# Patient Record
Sex: MALE | Race: WHITE | Employment: OTHER | URBAN - METROPOLITAN AREA
[De-identification: names, ages, dates, MRNs, and addresses within clinical notes are randomized per-mention and may not be internally consistent; named-entity substitution may affect disease eponyms.]

---

## 2024-03-08 ENCOUNTER — HOSPITAL ENCOUNTER (INPATIENT)
Facility: HOSPITAL | Age: 64
LOS: 6 days | Discharge: HOME | End: 2024-03-14
Attending: EMERGENCY MEDICINE | Admitting: HOSPITALIST
Payer: MEDICARE

## 2024-03-08 ENCOUNTER — APPOINTMENT (OUTPATIENT)
Dept: INTERVENTIONAL RADIOLOGY/VASCULAR | Facility: HOSPITAL | Age: 64
End: 2024-03-08
Attending: INTERNAL MEDICINE
Payer: MEDICARE

## 2024-03-08 ENCOUNTER — APPOINTMENT (OUTPATIENT)
Dept: GENERAL RADIOLOGY | Facility: HOSPITAL | Age: 64
End: 2024-03-08
Attending: EMERGENCY MEDICINE
Payer: MEDICARE

## 2024-03-08 DIAGNOSIS — I47.20 VENTRICULAR TACHYCARDIA (HCC): Primary | ICD-10-CM

## 2024-03-08 LAB
ALBUMIN SERPL-MCNC: 3.9 G/DL (ref 3.4–5)
ALBUMIN/GLOB SERPL: 1.1 {RATIO} (ref 1–2)
ALP LIVER SERPL-CCNC: 60 U/L
ALT SERPL-CCNC: 45 U/L
ANION GAP SERPL CALC-SCNC: 11 MMOL/L (ref 0–18)
APTT PPP: 28.6 SECONDS (ref 23.3–35.6)
AST SERPL-CCNC: 31 U/L (ref 15–37)
BASOPHILS # BLD AUTO: 0.07 X10(3) UL (ref 0–0.2)
BASOPHILS NFR BLD AUTO: 0.7 %
BILIRUB SERPL-MCNC: 1.6 MG/DL (ref 0.1–2)
BUN BLD-MCNC: 13 MG/DL (ref 9–23)
CALCIUM BLD-MCNC: 9.5 MG/DL (ref 8.5–10.1)
CHLORIDE SERPL-SCNC: 108 MMOL/L (ref 98–112)
CO2 SERPL-SCNC: 20 MMOL/L (ref 21–32)
CREAT BLD-MCNC: 1.07 MG/DL
EGFRCR SERPLBLD CKD-EPI 2021: 77 ML/MIN/1.73M2 (ref 60–?)
EOSINOPHIL # BLD AUTO: 0.32 X10(3) UL (ref 0–0.7)
EOSINOPHIL NFR BLD AUTO: 3.1 %
ERYTHROCYTE [DISTWIDTH] IN BLOOD BY AUTOMATED COUNT: 19.3 %
GLOBULIN PLAS-MCNC: 3.6 G/DL (ref 2.8–4.4)
GLUCOSE BLD-MCNC: 125 MG/DL (ref 70–99)
GLUCOSE BLD-MCNC: 126 MG/DL (ref 70–99)
HCT VFR BLD AUTO: 46.5 %
HGB BLD-MCNC: 14.6 G/DL
IMM GRANULOCYTES # BLD AUTO: 0.04 X10(3) UL (ref 0–1)
IMM GRANULOCYTES NFR BLD: 0.4 %
INR BLD: 1.07 (ref 0.8–1.2)
LYMPHOCYTES # BLD AUTO: 4.05 X10(3) UL (ref 1–4)
LYMPHOCYTES NFR BLD AUTO: 39.3 %
MAGNESIUM SERPL-MCNC: 1.9 MG/DL (ref 1.6–2.6)
MCH RBC QN AUTO: 19 PG (ref 26–34)
MCHC RBC AUTO-ENTMCNC: 31.4 G/DL (ref 31–37)
MCV RBC AUTO: 60.6 FL
MONOCYTES # BLD AUTO: 0.96 X10(3) UL (ref 0.1–1)
MONOCYTES NFR BLD AUTO: 9.3 %
NEUTROPHILS # BLD AUTO: 4.87 X10 (3) UL (ref 1.5–7.7)
NEUTROPHILS # BLD AUTO: 4.87 X10(3) UL (ref 1.5–7.7)
NEUTROPHILS NFR BLD AUTO: 47.2 %
OSMOLALITY SERPL CALC.SUM OF ELEC: 290 MOSM/KG (ref 275–295)
PLATELET # BLD AUTO: 45 10(3)UL (ref 150–450)
POTASSIUM SERPL-SCNC: 3.7 MMOL/L (ref 3.5–5.1)
PROT SERPL-MCNC: 7.5 G/DL (ref 6.4–8.2)
PROTHROMBIN TIME: 13.9 SECONDS (ref 11.6–14.8)
RBC # BLD AUTO: 7.67 X10(6)UL
SARS-COV-2 RNA RESP QL NAA+PROBE: NOT DETECTED
SODIUM SERPL-SCNC: 139 MMOL/L (ref 136–145)
TROPONIN I SERPL HS-MCNC: 12 NG/L
WBC # BLD AUTO: 10.3 X10(3) UL (ref 4–11)

## 2024-03-08 PROCEDURE — 71045 X-RAY EXAM CHEST 1 VIEW: CPT | Performed by: EMERGENCY MEDICINE

## 2024-03-08 PROCEDURE — B215YZZ FLUOROSCOPY OF LEFT HEART USING OTHER CONTRAST: ICD-10-PCS | Performed by: INTERNAL MEDICINE

## 2024-03-08 PROCEDURE — B211YZZ FLUOROSCOPY OF MULTIPLE CORONARY ARTERIES USING OTHER CONTRAST: ICD-10-PCS | Performed by: INTERNAL MEDICINE

## 2024-03-08 PROCEDURE — 5A2204Z RESTORATION OF CARDIAC RHYTHM, SINGLE: ICD-10-PCS | Performed by: EMERGENCY MEDICINE

## 2024-03-08 PROCEDURE — 99223 1ST HOSP IP/OBS HIGH 75: CPT | Performed by: STUDENT IN AN ORGANIZED HEALTH CARE EDUCATION/TRAINING PROGRAM

## 2024-03-08 PROCEDURE — 4A023N7 MEASUREMENT OF CARDIAC SAMPLING AND PRESSURE, LEFT HEART, PERCUTANEOUS APPROACH: ICD-10-PCS | Performed by: INTERNAL MEDICINE

## 2024-03-08 RX ORDER — ATORVASTATIN CALCIUM 40 MG/1
40 TABLET, FILM COATED ORAL NIGHTLY
COMMUNITY

## 2024-03-08 RX ORDER — BENZONATATE 100 MG/1
200 CAPSULE ORAL 3 TIMES DAILY PRN
Status: DISCONTINUED | OUTPATIENT
Start: 2024-03-08 | End: 2024-03-14

## 2024-03-08 RX ORDER — POLYETHYLENE GLYCOL 3350 17 G/17G
17 POWDER, FOR SOLUTION ORAL DAILY PRN
Status: DISCONTINUED | OUTPATIENT
Start: 2024-03-08 | End: 2024-03-14

## 2024-03-08 RX ORDER — LIDOCAINE HYDROCHLORIDE 10 MG/ML
INJECTION, SOLUTION EPIDURAL; INFILTRATION; INTRACAUDAL; PERINEURAL
Status: COMPLETED
Start: 2024-03-08 | End: 2024-03-08

## 2024-03-08 RX ORDER — IODIXANOL 320 MG/ML
150 INJECTION, SOLUTION INTRAVASCULAR
Status: COMPLETED | OUTPATIENT
Start: 2024-03-08 | End: 2024-03-08

## 2024-03-08 RX ORDER — MELATONIN
3 NIGHTLY PRN
Status: DISCONTINUED | OUTPATIENT
Start: 2024-03-08 | End: 2024-03-14

## 2024-03-08 RX ORDER — IODIXANOL 320 MG/ML
100 INJECTION, SOLUTION INTRAVASCULAR
Status: CANCELLED | OUTPATIENT
Start: 2024-03-08

## 2024-03-08 RX ORDER — MIDAZOLAM HYDROCHLORIDE 1 MG/ML
INJECTION INTRAMUSCULAR; INTRAVENOUS
Status: COMPLETED
Start: 2024-03-08 | End: 2024-03-08

## 2024-03-08 RX ORDER — ENEMA 19; 7 G/133ML; G/133ML
1 ENEMA RECTAL ONCE AS NEEDED
Status: DISCONTINUED | OUTPATIENT
Start: 2024-03-08 | End: 2024-03-14

## 2024-03-08 RX ORDER — NICOTINE POLACRILEX 4 MG
15 LOZENGE BUCCAL
Status: DISCONTINUED | OUTPATIENT
Start: 2024-03-08 | End: 2024-03-14

## 2024-03-08 RX ORDER — DAPAGLIFLOZIN 10 MG/1
10 TABLET, FILM COATED ORAL
COMMUNITY

## 2024-03-08 RX ORDER — MAGNESIUM SULFATE HEPTAHYDRATE 40 MG/ML
2 INJECTION, SOLUTION INTRAVENOUS ONCE
Status: COMPLETED | OUTPATIENT
Start: 2024-03-08 | End: 2024-03-09

## 2024-03-08 RX ORDER — DEXTROSE MONOHYDRATE 25 G/50ML
50 INJECTION, SOLUTION INTRAVENOUS
Status: DISCONTINUED | OUTPATIENT
Start: 2024-03-08 | End: 2024-03-14

## 2024-03-08 RX ORDER — SENNOSIDES 8.6 MG
17.2 TABLET ORAL NIGHTLY PRN
Status: DISCONTINUED | OUTPATIENT
Start: 2024-03-08 | End: 2024-03-14

## 2024-03-08 RX ORDER — GUAIFENESIN 600 MG/1
600 TABLET, EXTENDED RELEASE ORAL 2 TIMES DAILY PRN
Status: DISCONTINUED | OUTPATIENT
Start: 2024-03-08 | End: 2024-03-14

## 2024-03-08 RX ORDER — HEPARIN SODIUM 5000 [USP'U]/ML
INJECTION, SOLUTION INTRAVENOUS; SUBCUTANEOUS
Status: COMPLETED
Start: 2024-03-08 | End: 2024-03-08

## 2024-03-08 RX ORDER — METOPROLOL TARTRATE 1 MG/ML
5 INJECTION, SOLUTION INTRAVENOUS ONCE
Status: COMPLETED | OUTPATIENT
Start: 2024-03-08 | End: 2024-03-08

## 2024-03-08 RX ORDER — METOCLOPRAMIDE HYDROCHLORIDE 5 MG/ML
10 INJECTION INTRAMUSCULAR; INTRAVENOUS EVERY 8 HOURS PRN
Status: DISCONTINUED | OUTPATIENT
Start: 2024-03-08 | End: 2024-03-14

## 2024-03-08 RX ORDER — ATORVASTATIN CALCIUM 40 MG/1
40 TABLET, FILM COATED ORAL NIGHTLY
Status: DISCONTINUED | OUTPATIENT
Start: 2024-03-08 | End: 2024-03-08

## 2024-03-08 RX ORDER — LISINOPRIL 20 MG/1
20 TABLET ORAL DAILY
Status: DISCONTINUED | OUTPATIENT
Start: 2024-03-08 | End: 2024-03-08

## 2024-03-08 RX ORDER — ECHINACEA PURPUREA EXTRACT 125 MG
1 TABLET ORAL
Status: DISCONTINUED | OUTPATIENT
Start: 2024-03-08 | End: 2024-03-14

## 2024-03-08 RX ORDER — BISACODYL 10 MG
10 SUPPOSITORY, RECTAL RECTAL
Status: DISCONTINUED | OUTPATIENT
Start: 2024-03-08 | End: 2024-03-14

## 2024-03-08 RX ORDER — ONDANSETRON 2 MG/ML
4 INJECTION INTRAMUSCULAR; INTRAVENOUS EVERY 6 HOURS PRN
Status: DISCONTINUED | OUTPATIENT
Start: 2024-03-08 | End: 2024-03-09

## 2024-03-08 RX ORDER — GLIPIZIDE 5 MG/1
10 TABLET ORAL NIGHTLY
COMMUNITY

## 2024-03-08 RX ORDER — LISINOPRIL 20 MG/1
20 TABLET ORAL DAILY
COMMUNITY

## 2024-03-08 RX ORDER — AMIODARONE HYDROCHLORIDE 50 MG/ML
INJECTION, SOLUTION INTRAVENOUS
Status: DISPENSED
Start: 2024-03-08 | End: 2024-03-09

## 2024-03-08 RX ORDER — HEPARIN SODIUM AND DEXTROSE 10000; 5 [USP'U]/100ML; G/100ML
1000 INJECTION INTRAVENOUS ONCE
Status: DISCONTINUED | OUTPATIENT
Start: 2024-03-08 | End: 2024-03-08

## 2024-03-08 RX ORDER — HEPARIN SODIUM AND DEXTROSE 10000; 5 [USP'U]/100ML; G/100ML
INJECTION INTRAVENOUS CONTINUOUS
Status: DISCONTINUED | OUTPATIENT
Start: 2024-03-09 | End: 2024-03-08

## 2024-03-08 RX ORDER — ACETAMINOPHEN 500 MG
500 TABLET ORAL EVERY 4 HOURS PRN
Status: DISCONTINUED | OUTPATIENT
Start: 2024-03-08 | End: 2024-03-14

## 2024-03-08 RX ORDER — NICOTINE POLACRILEX 4 MG
30 LOZENGE BUCCAL
Status: DISCONTINUED | OUTPATIENT
Start: 2024-03-08 | End: 2024-03-14

## 2024-03-08 RX ORDER — HEPARIN SODIUM 1000 [USP'U]/ML
5000 INJECTION, SOLUTION INTRAVENOUS; SUBCUTANEOUS ONCE
Status: COMPLETED | OUTPATIENT
Start: 2024-03-08 | End: 2024-03-08

## 2024-03-08 RX ORDER — AMIODARONE HYDROCHLORIDE 50 MG/ML
INJECTION, SOLUTION INTRAVENOUS
Status: COMPLETED | OUTPATIENT
Start: 2024-03-08 | End: 2024-03-08

## 2024-03-09 ENCOUNTER — APPOINTMENT (OUTPATIENT)
Dept: CV DIAGNOSTICS | Facility: HOSPITAL | Age: 64
End: 2024-03-09
Attending: INTERNAL MEDICINE
Payer: MEDICARE

## 2024-03-09 LAB
ALBUMIN SERPL-MCNC: 3.3 G/DL (ref 3.4–5)
ALBUMIN/GLOB SERPL: 1.1 {RATIO} (ref 1–2)
ALP LIVER SERPL-CCNC: 51 U/L
ALT SERPL-CCNC: 44 U/L
ANION GAP SERPL CALC-SCNC: 5 MMOL/L (ref 0–18)
APTT PPP: 27.3 SECONDS (ref 23.3–35.6)
AST SERPL-CCNC: 28 U/L (ref 15–37)
ATRIAL RATE: 114 BPM
BASOPHILS # BLD AUTO: 0.05 X10(3) UL (ref 0–0.2)
BASOPHILS NFR BLD AUTO: 0.6 %
BILIRUB SERPL-MCNC: 1.6 MG/DL (ref 0.1–2)
BUN BLD-MCNC: 15 MG/DL (ref 9–23)
CALCIUM BLD-MCNC: 8.9 MG/DL (ref 8.5–10.1)
CHLORIDE SERPL-SCNC: 111 MMOL/L (ref 98–112)
CO2 SERPL-SCNC: 23 MMOL/L (ref 21–32)
CREAT BLD-MCNC: 0.81 MG/DL
EGFRCR SERPLBLD CKD-EPI 2021: 97 ML/MIN/1.73M2 (ref 60–?)
EOSINOPHIL # BLD AUTO: 0.18 X10(3) UL (ref 0–0.7)
EOSINOPHIL NFR BLD AUTO: 2.2 %
ERYTHROCYTE [DISTWIDTH] IN BLOOD BY AUTOMATED COUNT: 18.5 %
EST. AVERAGE GLUCOSE BLD GHB EST-MCNC: 163 MG/DL (ref 68–126)
GLOBULIN PLAS-MCNC: 3 G/DL (ref 2.8–4.4)
GLUCOSE BLD-MCNC: 133 MG/DL (ref 70–99)
GLUCOSE BLD-MCNC: 155 MG/DL (ref 70–99)
GLUCOSE BLD-MCNC: 157 MG/DL (ref 70–99)
GLUCOSE BLD-MCNC: 164 MG/DL (ref 70–99)
GLUCOSE BLD-MCNC: 167 MG/DL (ref 70–99)
HBA1C MFR BLD: 7.3 % (ref ?–5.7)
HCT VFR BLD AUTO: 40.9 %
HGB BLD-MCNC: 12.7 G/DL
IMM GRANULOCYTES # BLD AUTO: 0.05 X10(3) UL (ref 0–1)
IMM GRANULOCYTES NFR BLD: 0.6 %
LYMPHOCYTES # BLD AUTO: 1.86 X10(3) UL (ref 1–4)
LYMPHOCYTES NFR BLD AUTO: 22.8 %
MCH RBC QN AUTO: 18.7 PG (ref 26–34)
MCHC RBC AUTO-ENTMCNC: 31.1 G/DL (ref 31–37)
MCV RBC AUTO: 60.2 FL
MONOCYTES # BLD AUTO: 0.68 X10(3) UL (ref 0.1–1)
MONOCYTES NFR BLD AUTO: 8.3 %
MRSA DNA SPEC QL NAA+PROBE: NEGATIVE
NEUTROPHILS # BLD AUTO: 5.34 X10 (3) UL (ref 1.5–7.7)
NEUTROPHILS # BLD AUTO: 5.34 X10(3) UL (ref 1.5–7.7)
NEUTROPHILS NFR BLD AUTO: 65.5 %
OSMOLALITY SERPL CALC.SUM OF ELEC: 292 MOSM/KG (ref 275–295)
P AXIS: 49 DEGREES
P-R INTERVAL: 150 MS
PLATELET # BLD AUTO: 57 10(3)UL (ref 150–450)
POTASSIUM SERPL-SCNC: 3.9 MMOL/L (ref 3.5–5.1)
PROT SERPL-MCNC: 6.3 G/DL (ref 6.4–8.2)
Q-T INTERVAL: 260 MS
Q-T INTERVAL: 336 MS
QRS DURATION: 104 MS
QRS DURATION: 164 MS
QTC CALCULATION (BEZET): 463 MS
QTC CALCULATION (BEZET): 512 MS
R AXIS: -70 DEGREES
R AXIS: 106 DEGREES
RBC # BLD AUTO: 6.79 X10(6)UL
SODIUM SERPL-SCNC: 139 MMOL/L (ref 136–145)
T AXIS: 244 DEGREES
T AXIS: 65 DEGREES
VENTRICULAR RATE: 114 BPM
VENTRICULAR RATE: 233 BPM
WBC # BLD AUTO: 8.2 X10(3) UL (ref 4–11)

## 2024-03-09 PROCEDURE — 99233 SBSQ HOSP IP/OBS HIGH 50: CPT | Performed by: HOSPITALIST

## 2024-03-09 RX ORDER — POTASSIUM CHLORIDE 20 MEQ/1
40 TABLET, EXTENDED RELEASE ORAL DAILY
Status: DISCONTINUED | OUTPATIENT
Start: 2024-03-09 | End: 2024-03-14

## 2024-03-09 NOTE — PROGRESS NOTES
[facility]  Cardiology Progress Note    Jalen Jeronimo Patient Status:  Inpatient    3/3/1960 MRN [id number]   Location [facility] 6NE-A Attending Constanza Hill MD   Hosp Day # 1 PCP Tommy Allen       Subjective:  No acute events overnight  Today feels back to normal    --------------------------------------------------------------------------------------------------------------------------------  ROS 12 systems reviewed and at baseline, pertinent findings above.      History:  Past Medical History:   Diagnosis Date    Blood disorder     Diabetes (HCC)     Thrombocytopenia, idiopathic (HCC)      History reviewed. No pertinent surgical history.  History reviewed. No pertinent family history.   reports that he has never smoked. He has never used smokeless tobacco. He reports current alcohol use. He reports that he does not use drugs.    Objective:   Temp: 98.1 °F (36.7 °C)  Pulse: 71  Resp: 16  BP: 108/74    Intake/Output:     Intake/Output Summary (Last 24 hours) at 3/9/2024 0840  Last data filed at 3/9/2024 0800  Gross per 24 hour   Intake 419.05 ml   Output 425 ml   Net -5.95 ml       Physical Exam:  General: NAD.  HEENT: Normocephalic.  Neck: Supple.  Cardiac: RRR. S1, S2 normal. No murmur.  Lungs: GAEB.  Extremities: No edema.    Tele NSR, NSVTs overnight    Assessment:    MMVT  HTN  DLP  DM2      Plan:  Presented with MM-VT. He required shock x 3 in the ED. Emergent cath showed no significant CAD  At present suspect re-entrant VT, possibly anterobasal based on ECG. Will await EP eval on Monday. Consideration for ablation/C-MRI  TTE  Tele showing frequent but short lived episodees of NSVT. Continue amiodarone & BB  Keep in CCU    Discussed with patient. Questions answered.    Please call with questions.     Level of care: L3    Najma Norman MD  Interventional Cardiology  [facility]    3/9/2024  8:40 AM

## 2024-03-09 NOTE — PROGRESS NOTES
[facility]   part of [facility]     Hospitalist Progress Note     Jalen Jeronimo Patient Status:  Inpatient    3/3/1960 MRN [id number]   Location [facility] 6NE-A Attending Constanza Hill MD   Hosp Day # 1 PCP Tommy Allen     Chief Complaint: cp    Subjective:     Patient denies sob cp    Objective:    Review of Systems:   A comprehensive review of systems was completed; pertinent positive and negatives stated in subjective.    Vital signs:  Temp:  [97.8 °F (36.6 °C)-98.5 °F (36.9 °C)] 98.1 °F (36.7 °C)  Pulse:  [] 74  Resp:  [16-25] 18  BP: (105-143)/(57-90) 119/69  SpO2:  [94 %-100 %] 97 %    Physical Exam:    General: No acute distress  Respiratory: No wheezes, no rhonchi  Cardiovascular: S1, S2, regular rate and rhythm  Abdomen: Soft, Non-tender, non-distended, positive bowel sounds  Neuro: No new focal deficits.   Extremities: No edema      Diagnostic Data:    Labs:  Recent Labs   Lab 24  0457   WBC 10.3  --  8.2   HGB 14.6  --  12.7*   MCV 60.6*  --  60.2*   PLT 45.0*  --  57.0*   INR  --  1.07  --        Recent Labs   Lab 24  0457   * 157*   BUN 13 15   CREATSERUM 1.07 0.81   CA 9.5 8.9   ALB 3.9 3.3*    139   K 3.7 3.9    111   CO2 20.0* 23.0   ALKPHO 60 51   AST 31 28   ALT 45 44   BILT 1.6 1.6   TP 7.5 6.3*       Estimated Creatinine Clearance: 86.8 mL/min (based on SCr of 0.81 mg/dL).    Recent Labs   Lab 24   TROPHS 12       Recent Labs   Lab 24   PTP 13.9   INR 1.07                  Microbiology    No results found for this visit on 24.      Imaging: Reviewed in Epic.    Medications:    potassium chloride  40 mEq Oral Daily    amiodarone  150 mg Intravenous Once    insulin aspart  1-68 Units Subcutaneous TID CC    insulin aspart  1-30 Units Subcutaneous TID AC and HS    metoprolol tartrate  25 mg Oral QID Beta Blocker       Assessment & Plan:      #V. tach  -EKG  reviewed  -Troponin negative x 1  -Amiodarone drip  -Heparin drip  -Cardiology consulted     #Diabetes  -Hyperglycemia protocol     #Hypertension  -Lisinopril     #Hyperlipidemia  -Statin     Plan of care discussed with patient      Constanza Hill MD    Supplementary Documentation:     Quality:  DVT Mechanical Prophylaxis:   SCDs,    DVT Pharmacologic Prophylaxis   Medication   None                Code Status: Not on file  Lim: No urinary catheter in place  Lim Duration (in days):   Central line:    VJ: 3/11/2024    Discharge is dependent on: progress  At this point Mr. Jeronimo is expected to be discharge to: home    The 21st Century Cures Act makes medical notes like these available to patients in the interest of transparency. Please be advised this is a medical document. Medical documents are intended to carry relevant information, facts as evident, and the clinical opinion of the practitioner. The medical note is intended as peer to peer communication and may appear blunt or direct. It is written in medical language and may contain abbreviations or verbiage that are unfamiliar.             **Certification      PHYSICIAN Certification of Need for Inpatient Hospitalization - Initial Certification    Patient will require inpatient services that will reasonably be expected to span two midnight's based on the clinical documentation in H+P.   Based on patients current state of illness, I anticipate that, after discharge, patient will require TBD.

## 2024-03-09 NOTE — PLAN OF CARE
Rec'd pt from ED @ 4388 s/p [facility]. Right groin access site perclosed. Site soft w/o hematoma. Palpable pedal pulse. CMS intact. VSS on RA. NSR to ST on tele. Frequent PVC's throughout evening. Lytes replaced per order. Amio gtt continued. Denies CP, SOB, dizziness, or palpitations. Adequate uop through urinal. Pt and spouse updated on POC.    Problem: Diabetes/Glucose Control  Goal: Glucose maintained within prescribed range  Description: INTERVENTIONS:  - Monitor Blood Glucose as ordered  - Assess for signs and symptoms of hyperglycemia and hypoglycemia  - Administer ordered medications to maintain glucose within target range  - Assess barriers to adequate nutritional intake and initiate nutrition consult as needed  - Instruct patient on self management of diabetes  Outcome: Progressing     Problem: Patient/Family Goals  Goal: Patient/Family Long Term Goal  Description: Patient's Long Term Goal: To go home    Interventions:  - Trend labs/vs  - See additional Care Plan goals for specific interventions  Outcome: Progressing  Goal: Patient/Family Short Term Goal  Description: Patient's Short Term Goal: Pain management    Interventions:   - Reposition  - Take pain meds prn  - See additional Care Plan goals for specific interventions  Outcome: Progressing

## 2024-03-09 NOTE — ED PROVIDER NOTES
Patient Seen in: [facility]      History     Chief Complaint   Patient presents with    Arrythmia/Palpitations     Stated Complaint: palpatations, jaw pain for weeks    Subjective:   HPI    Patient 64-year-old male who has had chest pain radiating to jaw.  Is been going on for about a week.  Admits he has been ignoring it.  He has history of diabetes.  Today was having dinner at the restaurant when symptoms became worse.  In triage patient had EKG performed demonstrating V. tach.  He was brought back to the ED       Objective:   Past Medical History:   Diagnosis Date    Diabetes (HCC)     Thrombocytopenia, idiopathic (HCC)               History reviewed. No pertinent surgical history.             Social History     Socioeconomic History    Marital status:    Tobacco Use    Smoking status: Never    Smokeless tobacco: Never   Vaping Use    Vaping Use: Never used   Substance and Sexual Activity    Alcohol use: Yes     Comment: occasionally    Drug use: Never              Review of Systems    Positive for stated complaint: palpatations, jaw pain for weeks  Other systems are as noted in HPI.  Constitutional and vital signs reviewed.      All other systems reviewed and negative except as noted above.    Physical Exam     ED Triage Vitals   BP 03/08/24 2005 138/76   Pulse 03/08/24 2008 107   Resp 03/08/24 2008 20   Temp --    Temp src --    SpO2 03/08/24 2008 100 %   O2 Device --        Current:/77   Pulse 113   Resp 21   Ht 172.7 cm (5' 8\")   Wt 129.3 kg   SpO2 99%   BMI 43.33 kg/m²         Physical Exam  Vitals and nursing note reviewed.   Constitutional:       Appearance: He is well-developed. He is ill-appearing and diaphoretic.   HENT:      Head: Normocephalic and atraumatic.   Eyes:      General: No scleral icterus.     Conjunctiva/sclera: Conjunctivae normal.   Cardiovascular:      Rate and Rhythm: Tachycardia present.   Pulmonary:      Effort: Pulmonary effort is  normal. No respiratory distress.      Breath sounds: No wheezing.   Abdominal:      General: There is no distension.      Tenderness: There is no abdominal tenderness. There is no guarding.   Musculoskeletal:         General: No tenderness. Normal range of motion.      Cervical back: Normal range of motion and neck supple.   Skin:     General: Skin is warm.      Findings: No rash.   Neurological:      General: No focal deficit present.      Mental Status: He is alert and oriented to person, place, and time.      Motor: No abnormal muscle tone.      Coordination: Coordination normal.   Psychiatric:         Mood and Affect: Mood normal.         Behavior: Behavior normal.              ED Course     Labs Reviewed   COMP METABOLIC PANEL (14) - Abnormal; Notable for the following components:       Result Value    Glucose 125 (*)     CO2 20.0 (*)     All other components within normal limits   CBC W/ DIFFERENTIAL - Abnormal; Notable for the following components:    RBC 7.67 (*)     PLT 45.0 (*)     MCV 60.6 (*)     MCH 19.0 (*)     Lymphocyte Absolute 4.05 (*)     All other components within normal limits   MAGNESIUM - Normal   TROPONIN I HIGH SENSITIVITY - Normal   PTT, ACTIVATED - Normal   PROTHROMBIN TIME (PT) - Normal   RAPID SARS-COV-2 BY PCR - Normal   CBC WITH DIFFERENTIAL WITH PLATELET    Narrative:     The following orders were created for panel order CBC With Differential With Platelet.  Procedure                               Abnormality         Status                     ---------                               -----------         ------                     CBC W/ DIFFERENTIAL[[id number]]          Abnormal            Final result                 Please view results for these tests on the individual orders.   SCAN SLIDE   PATH COMMENT CBC     EKG    Rate, intervals and axes as noted on EKG Report.  Rate: 233  Rhythm: VTACH  Reading: VTACH                           MDM          -Tracing on cardiac monitor and pulse  oximetry was reviewed by myself.   -The cardiac monitor revealed VTACH as interpreted by me. The cardiac monitor was ordered to monitor the patient for dysrhythmia  -Pulse oximetry was interpreted by me and was normal.  Pulse oximeter was ordered to monitor patient for hypoxia.        -History source other than patient -family        -Comorbidities did add complexity to the management are mentioned in the HPI above           -DDX: Includes but not limited to acute MI, V. tach        -I personally reviewed the radiographs findings and they show-no wide mediastinum  Please refer to radiology report for official interpretation    XR CHEST AP PORTABLE  (CPT=71045)   Final Result   PROCEDURE:  XR CHEST AP PORTABLE  (CPT=71045)       TECHNIQUE:  AP chest radiograph was obtained.       COMPARISON:  None.       INDICATIONS:  palpatations, jaw pain for weeks       PATIENT STATED HISTORY: (As transcribed by Technologist)  Patient offered    no additional history at this time.             FINDINGS:  Lungs and pleural spaces are clear.  There is a percutaneous    pacing pad on the chest.  Heart size is within normal limits.  Mediastinum    and jonn are otherwise unremarkable.                         =====   CONCLUSION:  There is no evidence of active cardiopulmonary disease on    this single portable chest radiograph.           LOCATION:  [facility]                       Dictated by (CST): Bravo Tovar MD on 3/08/2024 at 8:14 PM        Finalized by (CST): Bravo Tovar MD on 3/08/2024 at 8:15 PM             Labs Reviewed   COMP METABOLIC PANEL (14) - Abnormal; Notable for the following components:       Result Value    Glucose 125 (*)     CO2 20.0 (*)     All other components within normal limits   CBC W/ DIFFERENTIAL - Abnormal; Notable for the following components:    RBC 7.67 (*)     PLT 45.0 (*)     MCV 60.6 (*)     MCH 19.0 (*)     Lymphocyte Absolute 4.05 (*)     All other components within normal limits   MAGNESIUM - Normal    TROPONIN I HIGH SENSITIVITY - Normal   PTT, ACTIVATED - Normal   PROTHROMBIN TIME (PT) - Normal   RAPID SARS-COV-2 BY PCR - Normal   CBC WITH DIFFERENTIAL WITH PLATELET    Narrative:     The following orders were created for panel order CBC With Differential With Platelet.  Procedure                               Abnormality         Status                     ---------                               -----------         ------                     CBC W/ DIFFERENTIAL[[id number]]          Abnormal            Final result                 Please view results for these tests on the individual orders.   SCAN SLIDE   PATH COMMENT CBC     Patient laboratory workup reviewed.  Platelets of 45 troponin negative.  CMP is shows glucose 125 bicarb 20.  Magnesium is normal.  COVID is negative.        Patient placed on cardiac monitor pads were placed patient was shocked at 200 J x 3.  He was given amiodarone bolus and drip.  Converted to normal sinus rhythm.  Repeat EKG was performed demonstrating a rate of 114, intervals per EKG report.  Sinus tachycardia with frequent PVCs.  ST changes in lateral leads     Patient cardiac alert was immediately called.  He was started on amiodarone drip and heparin.  Discussed case with Dr. Heart who arrived to the ED and patient was transported to Cath Lab in improved condition.        A total of 35 minutes of critical care time (exclusive of billable procedures) was administered to manage the patient's unstable vital signs and cardiovascular instability due to his hypotension, ventricular tachycardia.  This involved direct patient intervention, complex decision making, and/or extensive discussions with the patient, family, and clinical staff.                                 Medical Decision Making      Disposition and Plan     Clinical Impression:  1. Ventricular tachycardia (HCC)         Disposition:  Send to or/cath/gi  3/8/2024  8:28 pm    Follow-up:  No follow-up provider  specified.        Medications Prescribed:  Current Discharge Medication List

## 2024-03-09 NOTE — CONSULTS
[place]  Cardiology Consultation    Jalen Jeronimo Patient Status:  Emergency    3/3/1960 MRN [id number]   Location [facility] INTERVENTIONAL SUITES Attending No att. providers found   Hosp Day # 0 PCP Tommy Allen     Reason for Consultation:  Sustained ventricular tachycardia, monomorphic    History of Present Illness:  Jlaen Jeronimo is a a(n) 64 year old male who presents with presented with lightheadedness and diaphoresis with chest pain.  Was noted to have sustained ventricular tachycardia in the emergency room.  He was shocked 3 times before restoring normal sinus rhythm.  Following that he denied any chest pain dizziness lightheadedness syncope or near syncope.  He has not been feeling well for last few days.  Denies any cardiac history.    History:  Past Medical History:   Diagnosis Date    Diabetes (HCC)     Thrombocytopenia, idiopathic (HCC)      History reviewed. No pertinent surgical history.  No family history on file.   reports that he has never smoked. He has never used smokeless tobacco. He reports current alcohol use. He reports that he does not use drugs.    Allergies:  Allergies   Allergen Reactions    Sulfa Antibiotics OTHER (SEE COMMENTS)     Back pain         Medications:    Current Facility-Administered Medications:     amiodarone (Cordarone) 150 mg in dextrose 5% 100 mL IV bolus, 150 mg, Intravenous, Once **FOLLOWED BY** amiodarone (Cordarone) 450 mg in dextrose 5% 250 mL infusion, 1 mg/min, Intravenous, Continuous **FOLLOWED BY** [START ON 3/9/2024] amiodarone (Cordarone) 450 mg in dextrose 5% 250 mL infusion, 0.5 mg/min, Intravenous, Continuous    metoprolol (Lopressor) 5 mg/5mL injection 5 mg, 5 mg, Intravenous, Once    [START ON 3/9/2024] metoprolol tartrate (Lopressor) tab 25 mg, 25 mg, Oral, QID Beta Blocker    magnesium sulfate 40 mg/mL 2 g BOLUS FROM BAG infusion *For OB Use*, 2 g, Intravenous, Once    Review of Systems:  A comprehensive review of systems  was negative if not otherwise mention in above HPI.    /77   Pulse 113   Resp 21   Ht 5' 8\" (1.727 m)   Wt 285 lb (129.3 kg)   SpO2 99%   BMI 43.33 kg/m²   No data recorded.     No intake or output data in the 24 hours ending 03/08/24 2138  Wt Readings from Last 3 Encounters:   03/08/24 285 lb (129.3 kg)   02/23/20 (!) 302 lb (137 kg)       Physical Exam:   General: Alert and oriented x 3. No apparent distress. No respiratory or constitutional distress.  HEENT: Normocephalic, anicteric sclera, neck supple.  Neck: No JVD, carotids 2+, no bruits.  Cardiac: Regular rate and rhythm. S1, S2 normal. No murmur, pericardial rub, S3.  Lungs: Clear without wheezes, rales, rhonchi or dullness.  Normal excursions and effort.  Abdomen: Soft, non-tender. BS-present.  Extremities: Without clubbing, cyanosis or edema.  Peripheral pulses are 2+.  Neurologic: Alert and oriented, normal affect.  Skin: Warm and dry.     Laboratory Data:  Lab Results   Component Value Date    WBC 10.3 03/08/2024    HGB 14.6 03/08/2024    HCT 46.5 03/08/2024    PLT 45.0 03/08/2024    CREATSERUM 1.07 03/08/2024    BUN 13 03/08/2024     03/08/2024    K 3.7 03/08/2024     03/08/2024    CO2 20.0 03/08/2024     03/08/2024    CA 9.5 03/08/2024    ALB 3.9 03/08/2024    ALKPHO 60 03/08/2024    BILT 1.6 03/08/2024    TP 7.5 03/08/2024    AST 31 03/08/2024    ALT 45 03/08/2024    PTT 28.6 03/08/2024    INR 1.07 03/08/2024    PTP 13.9 03/08/2024    MG 1.9 03/08/2024       Imaging:  Initial EKG and blood test reviewed  Impression:  Sustained monomorphic VT  Hypertension  Diabetes  Hyperlipidemia    Recommendations:  Patient was consented for left heart cardiac catheterization. The risks and benefits of the procedure were explained to the patient. 1-2% risk of coronary angiography, possible angioplasty, include, but are not limited to stroke, death, heart attack, coronary dissection requiring emergent CABG, hematoma, bleeding, allergic  reaction to medications, vascular damage requiring surgical repair, kidney failure requiring dialysis and others. Patient wishes to proceed.     Thank you for allowing me to participate in the care of your patient.    Luis Eduardo Heart MD  3/8/2024  9:38 PM    5

## 2024-03-09 NOTE — PROCEDURES
Cardiologist: Luis Eduardo Heart MD  Primary Proceduralist: Luis Eduardo Heart MD  Procedure Performed: LHC, COR, and LV  Date of Procedure: 3/8/2024     Pre procedure diagnosis: Sustained ventricular tachycardia  Post procedure diagnosis: As above    Summary of findings: No angiographic evidence of CAD.  Mild to moderate elevated LVEDP post sustained VT.  Normal LVEF.      Post procedure findings:  1) Left Ventriculography at 30 degrees VALDES: LVEF: 60% with no wall motion abnormality  2) Hemodynamics: LVEDP: 24 mmHg, no gradient across aortic valve  3) Coronaries:  Left main is patent and free of obstructive disease  LAD is patent and free of obstructive disease, supplies 2 diagonals which are non-obstructive  Cx is patent, dominant and free of obstructive disease, supplies 3 OM branches which are patent  RCA is nondominant and free of obstructive disease, supplies PDA.      Recommendations:  Admit to ICU  EP evaluation in the morning  Continue IV amiodarone  Replete electrolytes, keep potassium greater than 4 and magnesium greater than 2  2D echocardiogram tomorrow    Summary of Case: After written informed consent was obtained from the patient, patient was brought to the cardiac catheterization laboratory.  Patient was prepped and draped in the usual sterile fashion. Lidocaine 1% was used to infiltrate the right groin for local anesthesia and a 6 Citizen of [place] introducer sheath was inserted into the right femoral artery.      Selective coronary angiography performed with JR4 catheter for RCA and JL4 catheter for LCA.  Angiography performed in standard projections.          Specimen sent to: No specimen collected  Estimated blood loss: 10 cc  Closure: Perclose      IV was maintained by RN and moderate conscious sedation of versed and fentanyl was given.  Patient was assessed and monitoring of oxygen, heart rate and blood pressure by nurse and myself during the exam 25 minutes.      Luis Eduardo Heart MD  03/08/24

## 2024-03-09 NOTE — H&P
[facility]     Jalen Jeronimo Patient Status:  Emergency    3/3/1960 MRN [id number]   Location [facility] INTERVENTIONAL SUITES Attending No att. providers found   Hosp Day # 0 PCP Tommy Allen     Chief Complaint: Chest pain    Subjective:    History of Present Illness:     Jalen Jeronimo is a 64 year old male with past medical history of diabetes who presents ED for chest pain.  Patient reports he has had chest pain for the past few weeks.  Today he felt diaphoretic and dizzy after dinner.  He was also nauseous.  He came to ER for further evaluation.  In the ER, he was found to be in V. tach.  He was shocked 3 times and is now in normal sinus rhythm.  He denies any dyspnea, headaches, dizziness fevers, chills, abdominal pain, back    History/Other:    Past Medical History:  Past Medical History:   Diagnosis Date    Diabetes (HCC)     Thrombocytopenia, idiopathic (HCC)      Past Surgical History:   History reviewed. No pertinent surgical history.   Family History:   No family history on file.  Social History:    reports that he has never smoked. He has never used smokeless tobacco. He reports current alcohol use. He reports that he does not use drugs.     Allergies:   Allergies   Allergen Reactions    Sulfa Antibiotics OTHER (SEE COMMENTS)     Back pain         Medications:    No current facility-administered medications on file prior to encounter.     Current Outpatient Medications on File Prior to Encounter   Medication Sig Dispense Refill    metFORMIN HCl 1000 MG Oral Tab Take 1 tablet (1,000 mg total) by mouth 2 (two) times daily with meals.      atorvastatin 40 MG Oral Tab Take 1 tablet (40 mg total) by mouth nightly.      lisinopril 20 MG Oral Tab Take 1 tablet (20 mg total) by mouth daily.      glipiZIDE 5 MG Oral Tab Take 1 tablet (5 mg total) by mouth every morning before breakfast.      semaglutide 2 MG/3ML Subcutaneous Solution Pen-injector Inject 2 mL into the  skin once a week.      mupirocin (BACTROBAN) 2 % External Ointment Apply to affected area 3 times a day 1 Tube 0       Review of Systems:   A comprehensive review of systems was completed.    Pertinent positives and negatives noted in the HPI.    Objective:   Physical Exam:    /77   Pulse 113   Resp 21   Ht 5' 8\" (1.727 m)   Wt 285 lb (129.3 kg)   SpO2 99%   BMI 43.33 kg/m²   General: No acute distress, Alert  Respiratory: No rhonchi, no wheezes  Cardiovascular: S1, S2. Regular rate and rhythm  Abdomen: Soft, Non-tender, non-distended, positive bowel sounds  Neuro: No new focal deficits  Extremities: No edema    Results:    Labs:      Labs Last 24 Hours:    Recent Labs   Lab 03/08/24 2000   RBC 7.67*   HGB 14.6   HCT 46.5   MCV 60.6*   MCH 19.0*   MCHC 31.4   RDW 19.3   NEPRELIM 4.87   WBC 10.3   PLT 45.0*       Recent Labs   Lab 03/08/24 2000   *   BUN 13   CREATSERUM 1.07   EGFRCR 77   CA 9.5   ALB 3.9      K 3.7      CO2 20.0*   ALKPHO 60   AST 31   ALT 45   BILT 1.6   TP 7.5       Lab Results   Component Value Date    INR 1.07 03/08/2024       Recent Labs   Lab 03/08/24 2000   TROPHS 12       No results for input(s): \"TROP\", \"PBNP\" in the last 168 hours.    No results for input(s): \"PCT\" in the last 168 hours.    Imaging: Imaging data reviewed in Epic.    Assessment & Plan:      #V. tach  -EKG reviewed  -Troponin negative x 1  -Amiodarone drip  -Heparin drip  -Plan to take patient to Cath Lab today  -Cardiology consulted    #Diabetes  -Hyperglycemia protocol    #Hypertension  -Lisinopril    #Hyperlipidemia  -Statin    Plan of care discussed with patient    Mitch Frazier DO    Supplementary Documentation:     The 21st Century Cures Act makes medical notes like these available to patients in the interest of transparency. Please be advised this is a medical document. Medical documents are intended to carry relevant information, facts as evident, and the clinical opinion of the  practitioner. The medical note is intended as peer to peer communication and may appear blunt or direct. It is written in medical language and may contain abbreviations or verbiage that are unfamiliar.

## 2024-03-09 NOTE — ED INITIAL ASSESSMENT (HPI)
Pt comes in with c/o chest pain for a \"couple of weeks\". Pt in vtach upon arrival to room. Staff at bedside, cardiac alert paged 1955.    Pt states pain worse today, nauseous after dinner & jaw pain.

## 2024-03-10 ENCOUNTER — APPOINTMENT (OUTPATIENT)
Dept: CV DIAGNOSTICS | Facility: HOSPITAL | Age: 64
End: 2024-03-10
Attending: INTERNAL MEDICINE
Payer: MEDICARE

## 2024-03-10 LAB
ATRIAL RATE: 79 BPM
GLUCOSE BLD-MCNC: 128 MG/DL (ref 70–99)
GLUCOSE BLD-MCNC: 140 MG/DL (ref 70–99)
GLUCOSE BLD-MCNC: 163 MG/DL (ref 70–99)
GLUCOSE BLD-MCNC: 218 MG/DL (ref 70–99)
P AXIS: 31 DEGREES
P-R INTERVAL: 150 MS
Q-T INTERVAL: 414 MS
QRS DURATION: 106 MS
QTC CALCULATION (BEZET): 474 MS
R AXIS: -60 DEGREES
T AXIS: 50 DEGREES
VENTRICULAR RATE: 79 BPM

## 2024-03-10 PROCEDURE — 93306 TTE W/DOPPLER COMPLETE: CPT | Performed by: INTERNAL MEDICINE

## 2024-03-10 PROCEDURE — 99232 SBSQ HOSP IP/OBS MODERATE 35: CPT | Performed by: HOSPITALIST

## 2024-03-10 NOTE — PROGRESS NOTES
[facility]  Cardiology Progress Note    Jalen Jeronimo Patient Status:  Inpatient    3/3/1960 MRN [id number]   Location [facility] 6NE-A Attending Constanza Hill MD   Hosp Day # 2 PCP Tommy Allen       Subjective:  No acute events overnight  Feels okay today  No cardiac symptoms    --------------------------------------------------------------------------------------------------------------------------------  ROS 12 systems reviewed and at baseline, pertinent findings above.      History:  Past Medical History:   Diagnosis Date    Blood disorder     Diabetes (HCC)     Thrombocytopenia, idiopathic (HCC)      History reviewed. No pertinent surgical history.  History reviewed. No pertinent family history.   reports that he has never smoked. He has never used smokeless tobacco. He reports current alcohol use. He reports that he does not use drugs.    Objective:   Temp: 98 °F (36.7 °C)  Pulse: 74  Resp: 16  BP: 107/61    Intake/Output:     Intake/Output Summary (Last 24 hours) at 3/10/2024 0920  Last data filed at 3/10/2024 0637  Gross per 24 hour   Intake 1269 ml   Output 1300 ml   Net -31 ml       Physical Exam:  General: NAD.  HEENT: Normocephalic.  Neck: Supple.  Cardiac: RRR. S1, S2 normal. No murmur.  Lungs: GAEB.  Extremities: No edema.    Tele NSR, frequent NSVT    Assessment:    MMVT  HTN  DLP  DM2      Plan:  Sustained VT on presentation requiring cardioversion x 3. Emergent cath showed no CAD. Stabilized with amiodarone  Suspect re-entrant VT, possibly anterobasal based on ECG. EP eval tomorrow +/- C-MRI  TTE pending  Continue amiodarone, BB  Keep IN CCU    Discussed with patient. Questions answered.    Please call with questions.     Level of care: L3    Najma Norman MD  Interventional Cardiology  [facility]    3/10/2024  9:20 AM

## 2024-03-10 NOTE — PROGRESS NOTES
[facility]   part of [facility]     Hospitalist Progress Note     Jalen Jeronimo Patient Status:  Inpatient    3/3/1960 MRN [id number]   Location [facility] 6NE-A Attending Constanza Hill MD   Hosp Day # 2 PCP Tommy Allen     Chief Complaint: cp    Subjective:     Patient denies sob cp    Objective:    Review of Systems:   A comprehensive review of systems was completed; pertinent positive and negatives stated in subjective.    Vital signs:  Temp:  [97.6 °F (36.4 °C)-98.1 °F (36.7 °C)] 97.6 °F (36.4 °C)  Pulse:  [65-85] 73  Resp:  [14-24] 21  BP: (105-132)/(50-81) 123/63  SpO2:  [92 %-99 %] 97 %    Physical Exam:    General: No acute distress  Respiratory: No wheezes, no rhonchi  Cardiovascular: S1, S2, regular rate and rhythm  Abdomen: Soft, Non-tender, non-distended, positive bowel sounds  Neuro: No new focal deficits.   Extremities: No edema      Diagnostic Data:    Labs:  Recent Labs   Lab 24  0457   WBC 10.3  --  8.2   HGB 14.6  --  12.7*   MCV 60.6*  --  60.2*   PLT 45.0*  --  57.0*   INR  --  1.07  --        Recent Labs   Lab 24  0457   * 157*   BUN 13 15   CREATSERUM 1.07 0.81   CA 9.5 8.9   ALB 3.9 3.3*    139   K 3.7 3.9    111   CO2 20.0* 23.0   ALKPHO 60 51   AST 31 28   ALT 45 44   BILT 1.6 1.6   TP 7.5 6.3*       Estimated Creatinine Clearance: 86.8 mL/min (based on SCr of 0.81 mg/dL).    Recent Labs   Lab 24   TROPHS 12       Recent Labs   Lab 24   PTP 13.9   INR 1.07                  Microbiology    No results found for this visit on 24.      Imaging: Reviewed in Epic.    Medications:    potassium chloride  40 mEq Oral Daily    amiodarone  150 mg Intravenous Once    insulin aspart  1-68 Units Subcutaneous TID CC    insulin aspart  1-30 Units Subcutaneous TID AC and HS    metoprolol tartrate  25 mg Oral QID Beta Blocker       Assessment & Plan:      #V. tach    -Troponin  negative x 1  -Amiodarone drip  -Heparin drip  -Cardiology consulted     #Diabetes  -Hyperglycemia protocol     #Hypertension  -Lisinopril     #Hyperlipidemia  -Statin     Plan of care discussed with patient      Constanza Hill MD    Supplementary Documentation:     Quality:  DVT Mechanical Prophylaxis:   SCDs,    DVT Pharmacologic Prophylaxis   Medication   None                Code Status: Not on file  Lim: No urinary catheter in place  Lim Duration (in days):   Central line:    VJ:     Discharge is dependent on: progress  At this point Mr. Jeronimo is expected to be discharge to: home    The 21st Century Cures Act makes medical notes like these available to patients in the interest of transparency. Please be advised this is a medical document. Medical documents are intended to carry relevant information, facts as evident, and the clinical opinion of the practitioner. The medical note is intended as peer to peer communication and may appear blunt or direct. It is written in medical language and may contain abbreviations or verbiage that are unfamiliar.             **Certification      PHYSICIAN Certification of Need for Inpatient Hospitalization - Initial Certification    Patient will require inpatient services that will reasonably be expected to span two midnight's based on the clinical documentation in H+P.   Based on patients current state of illness, I anticipate that, after discharge, patient will require TBD.

## 2024-03-10 NOTE — PLAN OF CARE
Patient awake and alert sitting up in chair.  Monitor SR with pvc and triplets.  Amiodarone 0.5 mg/min.  Right lower arm red swollen changed amiodarone upper IV, removed IV and using ice pack for comfort.    Problem: CARDIOVASCULAR - ADULT  Goal: Maintains optimal cardiac output and hemodynamic stability  Description: INTERVENTIONS:  - Monitor vital signs, rhythm, and trends  - Monitor for bleeding, hypotension and signs of decreased cardiac output  - Evaluate effectiveness of vasoactive medications to optimize hemodynamic stability  - Monitor arterial and/or venous puncture sites for bleeding and/or hematoma  - Assess quality of pulses, skin color and temperature  - Assess for signs of decreased coronary artery perfusion - ex. Angina  - Evaluate fluid balance, assess for edema, trend weights  Outcome: Progressing  Goal: Absence of cardiac arrhythmias or at baseline  Description: INTERVENTIONS:  - Continuous cardiac monitoring, monitor vital signs, obtain 12 lead EKG if indicated  - Evaluate effectiveness of antiarrhythmic and heart rate control medications as ordered  - Initiate emergency measures for life threatening arrhythmias  - Monitor electrolytes and administer replacement therapy as ordered  Outcome: Progressing

## 2024-03-10 NOTE — PLAN OF CARE
Assumed pt care this evening. No acute events overnight. VSS on RA. NSR on tele. Occasional runs of NSVT. Amio gtt continued per order. Denies any CP, palpitations, dizziness. Pt updated on POC.    Problem: Diabetes/Glucose Control  Goal: Glucose maintained within prescribed range  Description: INTERVENTIONS:  - Monitor Blood Glucose as ordered  - Assess for signs and symptoms of hyperglycemia and hypoglycemia  - Administer ordered medications to maintain glucose within target range  - Assess barriers to adequate nutritional intake and initiate nutrition consult as needed  - Instruct patient on self management of diabetes  Outcome: Progressing     Problem: Patient/Family Goals  Goal: Patient/Family Long Term Goal  Description: Patient's Long Term Goal: To go home    Interventions:  - Trend labs/vs  - See additional Care Plan goals for specific interventions  Outcome: Progressing  Goal: Patient/Family Short Term Goal  Description: Patient's Short Term Goal: Pain management    Interventions:   - Reposition  - Take pain meds prn  - See additional Care Plan goals for specific interventions  Outcome: Progressing

## 2024-03-11 LAB
GLUCOSE BLD-MCNC: 142 MG/DL (ref 70–99)
GLUCOSE BLD-MCNC: 145 MG/DL (ref 70–99)
GLUCOSE BLD-MCNC: 189 MG/DL (ref 70–99)
GLUCOSE BLD-MCNC: 206 MG/DL (ref 70–99)

## 2024-03-11 PROCEDURE — 99232 SBSQ HOSP IP/OBS MODERATE 35: CPT | Performed by: HOSPITALIST

## 2024-03-11 RX ORDER — AMIODARONE HYDROCHLORIDE 200 MG/1
400 TABLET ORAL 3 TIMES DAILY
Status: DISCONTINUED | OUTPATIENT
Start: 2024-03-11 | End: 2024-03-14

## 2024-03-11 NOTE — PROGRESS NOTES
[facility]   part of [facility]     Hospitalist Progress Note     Jalen Jeronimo Patient Status:  Inpatient    3/3/1960 MRN [id number]   [facility] 6NE-A Attending Constanza Hill MD   Hosp Day # 3 PCP Tommy Allen     Chief Complaint: cp    Subjective:     Patient denies sob cp    Objective:    Review of Systems:   A comprehensive review of systems was completed; pertinent positive and negatives stated in subjective.    Vital signs:  Temp:  [97 °F (36.1 °C)-98.2 °F (36.8 °C)] 97.8 °F (36.6 °C)  Pulse:  [67-84] 75  Resp:  [10-23] 20  BP: ()/(36-83) 109/42  SpO2:  [94 %-98 %] 97 %    Physical Exam:    General: No acute distress  Respiratory: No wheezes, no rhonchi  Cardiovascular: S1, S2, regular rate and rhythm  Abdomen: Soft, Non-tender, non-distended, positive bowel sounds  Neuro: No new focal deficits.   Extremities: No edema      Diagnostic Data:    Labs:  Recent Labs   Lab 24  0457   WBC 10.3  --  8.2   HGB 14.6  --  12.7*   MCV 60.6*  --  60.2*   PLT 45.0*  --  57.0*   INR  --  1.07  --        Recent Labs   Lab 24  0457   * 157*   BUN 13 15   CREATSERUM 1.07 0.81   CA 9.5 8.9   ALB 3.9 3.3*    139   K 3.7 3.9    111   CO2 20.0* 23.0   ALKPHO 60 51   AST 31 28   ALT 45 44   BILT 1.6 1.6   TP 7.5 6.3*       Estimated Creatinine Clearance: 86.8 mL/min (based on SCr of 0.81 mg/dL).    Recent Labs   Lab 24   TROPHS 12       Recent Labs   Lab 24   PTP 13.9   INR 1.07                  Microbiology    No results found for this visit on 24.      Imaging: Reviewed in Epic.    Medications:    potassium chloride  40 mEq Oral Daily    amiodarone  150 mg Intravenous Once    insulin aspart  1-68 Units Subcutaneous TID CC    insulin aspart  1-30 Units Subcutaneous TID AC and HS    metoprolol tartrate  25 mg Oral QID Beta Blocker       Assessment & Plan:      #V. Tach  Aicd outpt  Heart  mri    -Troponin negative x 1  -Amiodarone drip  -Cardiology consulted     #Diabetes  -Hyperglycemia protocol     #Hypertension  -Lisinopril     #Hyperlipidemia  -Statin     Plan of care discussed with patient      Constanza Hill MD    Supplementary Documentation:     Quality:  DVT Mechanical Prophylaxis:   SCDs,    DVT Pharmacologic Prophylaxis   Medication   None                Code Status: Not on file  Lim: No urinary catheter in place  Lim Duration (in days):   Central line:    VJ:     Discharge is dependent on: progress  At this point Mr. Jeronimo is expected to be discharge to: home    The 21st Century Cures Act makes medical notes like these available to patients in the interest of transparency. Please be advised this is a medical document. Medical documents are intended to carry relevant information, facts as evident, and the clinical opinion of the practitioner. The medical note is intended as peer to peer communication and may appear blunt or direct. It is written in medical language and may contain abbreviations or verbiage that are unfamiliar.             **Certification      PHYSICIAN Certification of Need for Inpatient Hospitalization - Initial Certification    Patient will require inpatient services that will reasonably be expected to span two midnight's based on the clinical documentation in H+P.   Based on patients current state of illness, I anticipate that, after discharge, patient will require TBD.

## 2024-03-11 NOTE — PLAN OF CARE
Assumed pt care this evening. No acute events overnight. VSS on RA. NSR on tele. Occasional PVC's. Amio gtt continued. Denies any CP, palpitations, SOB, dizziness. Pt updated on POC.    Problem: Diabetes/Glucose Control  Goal: Glucose maintained within prescribed range  Description: INTERVENTIONS:  - Monitor Blood Glucose as ordered  - Assess for signs and symptoms of hyperglycemia and hypoglycemia  - Administer ordered medications to maintain glucose within target range  - Assess barriers to adequate nutritional intake and initiate nutrition consult as needed  - Instruct patient on self management of diabetes  Outcome: Progressing     Problem: Patient/Family Goals  Goal: Patient/Family Long Term Goal  Description: Patient's Long Term Goal: To go home    Interventions:  - Trend labs/vs  - See additional Care Plan goals for specific interventions  Outcome: Progressing  Goal: Patient/Family Short Term Goal  Description: Patient's Short Term Goal: Pain management    Interventions:   - Reposition  - Take pain meds prn  - See additional Care Plan goals for specific interventions  Outcome: Progressing     Problem: CARDIOVASCULAR - ADULT  Goal: Maintains optimal cardiac output and hemodynamic stability  Description: INTERVENTIONS:  - Monitor vital signs, rhythm, and trends  - Monitor for bleeding, hypotension and signs of decreased cardiac output  - Evaluate effectiveness of vasoactive medications to optimize hemodynamic stability  - Monitor arterial and/or venous puncture sites for bleeding and/or hematoma  - Assess quality of pulses, skin color and temperature  - Assess for signs of decreased coronary artery perfusion - ex. Angina  - Evaluate fluid balance, assess for edema, trend weights  Outcome: Progressing  Goal: Absence of cardiac arrhythmias or at baseline  Description: INTERVENTIONS:  - Continuous cardiac monitoring, monitor vital signs, obtain 12 lead EKG if indicated  - Evaluate effectiveness of antiarrhythmic  and heart rate control medications as ordered  - Initiate emergency measures for life threatening arrhythmias  - Monitor electrolytes and administer replacement therapy as ordered  Outcome: Progressing

## 2024-03-11 NOTE — CDS QUERY
CLINICAL DOCUMENTATION CLARIFICATION FORM    Dear ,   Clinical information (provided below) contains a documented Body Mass Index of 43.85 .    Please select a corresponding diagnosis:     (  x) Morbid Obesity    (  ) Other (please specify) ________________________________      Documentation from the medical record   Risk;DM2  Clinical indicators;  Height 5'8” weight  288    If you have any questions, please contact Clinical  at: Ricardo Castaneda RN, BSN , CCDS    #[phone].                                       THIS FORM IS A PERMANENT PART OF THE MEDICAL RECORD

## 2024-03-11 NOTE — PROGRESS NOTES
[facility]   part of [facility]     Cardiology Progress Note        Jalen Jeronimo Patient Status:  Inpatient    3/3/1960 MRN [id number]   Location [facility] 6NE-A Attending Constanza Hill MD   Hosp Day # 3 PCP Tommy Allen         Subjective/ROS:  Overall feels fine. Denies palpitations, lightheadedness or dizzines    Objective:  /42   Pulse 75   Temp 97.8 °F (36.6 °C) (Temporal)   Resp 20   Ht 5' 8\" (1.727 m)   Wt 288 lb 5.8 oz (130.8 kg)   SpO2 97%   BMI 43.85 kg/m²     Temp (24hrs), Av.5 °F (36.4 °C), Min:97 °F (36.1 °C), Max:98.2 °F (36.8 °C)      Tele  Sr with freq triplets, couplets, pvcs but no sustained runs of vt    Intake/Output:    Intake/Output Summary (Last 24 hours) at 3/11/2024 1232  Last data filed at 3/11/2024 1000  Gross per 24 hour   Intake 859.8 ml   Output 900 ml   Net -40.2 ml       Patient Weight for the past 72 hrs:   Weight   24 285 lb (129.3 kg)   24 2243 292 lb 12.3 oz (132.8 kg)   24 040 292 lb 12.3 oz (132.8 kg)   03/10/24 0600 287 lb 12.8 oz (130.5 kg)   24 0400 288 lb 5.8 oz (130.8 kg)        Physical Exam:    Gen: alert, oriented x 3, NAD  Heent: pupils equal, reactive. Mucous membranes moist.   Neck: no jvd  Cardiac: regular rate and rhythm, normal S1,S2  Lungs: CTA  Abd: soft, NT/ND +bs  Ext: no edema  Skin: Warm, dry  Neuro: No focal deficits    Labs:           Medications:     potassium chloride  40 mEq Oral Daily    amiodarone  150 mg Intravenous Once    insulin aspart  1-68 Units Subcutaneous TID CC    insulin aspart  1-30 Units Subcutaneous TID AC and HS    metoprolol tartrate  25 mg Oral QID Beta Blocker      amiodarone 0.5 mg/min (24 0210)       Assessment:    Sustained monomorphic ventricular tachycardia s/p 3 defibrillator shocks in ER to convert back to sr  Short runs of nsvt on tele, no longer runs  C 3/8/24 without cad  Echo with preserved lvef, no evidence of hocm. Rv function  normal  DM    Plan:     Cardiac MRI to assess for infiltrative heart disease, RV dysplasia, scar formation  Will convert to po amio  Will need ICD prior to dc if pt agreeable     Discussed plan of care with patient and nursing.       Rosemarie Simpson, MYRANDA  [phone]    ____________________________  Pt seen and examined and discussed with the APN.    Tele: NSR, PVCs    Exam  General- awake alert  HEENT- PEERLA  Neck- JVP normal  CV- RRR  Lungs- CTA  Extremities- no edema  Neuro- Normal  Psych- mood/affect congruent  Skin- no lesions    I have personally performed the medical decision making in its entirety. My additions include:  Plan  - CMR  - No clear CV disease to account for sustained MMVT.  ECG is most c/w VT over SVT with aberrancy.  - Likely needs ICD prior to discharge.  - Discussed options, including ablation, but cannot clearly know if the VT woul; be cured to avoid an ICD.      R3    ____________________________  Brayan Babin MD, FACC, FHRS  Cardiac Electrophysiololgy  [facility]  3/11/2024

## 2024-03-12 ENCOUNTER — APPOINTMENT (OUTPATIENT)
Dept: ULTRASOUND IMAGING | Facility: HOSPITAL | Age: 64
End: 2024-03-12
Attending: HOSPITALIST
Payer: MEDICARE

## 2024-03-12 LAB
ANION GAP SERPL CALC-SCNC: 3 MMOL/L (ref 0–18)
BUN BLD-MCNC: 16 MG/DL (ref 9–23)
CALCIUM BLD-MCNC: 9.5 MG/DL (ref 8.5–10.1)
CHLORIDE SERPL-SCNC: 107 MMOL/L (ref 98–112)
CO2 SERPL-SCNC: 27 MMOL/L (ref 21–32)
CREAT BLD-MCNC: 1.1 MG/DL
EGFRCR SERPLBLD CKD-EPI 2021: 74 ML/MIN/1.73M2 (ref 60–?)
GLUCOSE BLD-MCNC: 124 MG/DL (ref 70–99)
GLUCOSE BLD-MCNC: 151 MG/DL (ref 70–99)
GLUCOSE BLD-MCNC: 152 MG/DL (ref 70–99)
GLUCOSE BLD-MCNC: 198 MG/DL (ref 70–99)
GLUCOSE BLD-MCNC: 318 MG/DL (ref 70–99)
ISTAT ACTIVATED CLOTTING TIME: 196 SECONDS (ref 74–137)
MAGNESIUM SERPL-MCNC: 2 MG/DL (ref 1.6–2.6)
OSMOLALITY SERPL CALC.SUM OF ELEC: 288 MOSM/KG (ref 275–295)
POTASSIUM SERPL-SCNC: 4 MMOL/L (ref 3.5–5.1)
SODIUM SERPL-SCNC: 137 MMOL/L (ref 136–145)

## 2024-03-12 PROCEDURE — 93971 EXTREMITY STUDY: CPT | Performed by: HOSPITALIST

## 2024-03-12 PROCEDURE — 99232 SBSQ HOSP IP/OBS MODERATE 35: CPT | Performed by: HOSPITALIST

## 2024-03-12 RX ORDER — METOPROLOL TARTRATE 50 MG/1
50 TABLET, FILM COATED ORAL
Status: DISCONTINUED | OUTPATIENT
Start: 2024-03-12 | End: 2024-03-14

## 2024-03-12 NOTE — PLAN OF CARE
Assumed care of pt at approximately 1930. NSR on tele, occasional PVCs. On RA. Ambulating in room and halls with steady gait no assistive devices. Awaiting cardiac MRI, this RN called MRI and confirmed either Tuesday or Wednesday. See flowsheets for full assessments.    Problem: CARDIOVASCULAR - ADULT  Goal: Absence of cardiac arrhythmias or at baseline  Description: INTERVENTIONS:  - Continuous cardiac monitoring, monitor vital signs, obtain 12 lead EKG if indicated  - Evaluate effectiveness of antiarrhythmic and heart rate control medications as ordered  - Initiate emergency measures for life threatening arrhythmias  - Monitor electrolytes and administer replacement therapy as ordered  Outcome: Not Progressing     Problem: Diabetes/Glucose Control  Goal: Glucose maintained within prescribed range  Description: INTERVENTIONS:  - Monitor Blood Glucose as ordered  - Assess for signs and symptoms of hyperglycemia and hypoglycemia  - Administer ordered medications to maintain glucose within target range  - Assess barriers to adequate nutritional intake and initiate nutrition consult as needed  - Instruct patient on self management of diabetes  Outcome: Progressing     Problem: Patient/Family Goals  Goal: Patient/Family Long Term Goal  Description: Patient's Long Term Goal: To go home    Interventions:  - Trend labs/vs  - See additional Care Plan goals for specific interventions  Outcome: Progressing  Goal: Patient/Family Short Term Goal  Description: Patient's Short Term Goal: Pain management    Interventions:   - Reposition  - Take pain meds prn  - See additional Care Plan goals for specific interventions  Outcome: Progressing     Problem: CARDIOVASCULAR - ADULT  Goal: Maintains optimal cardiac output and hemodynamic stability  Description: INTERVENTIONS:  - Monitor vital signs, rhythm, and trends  - Monitor for bleeding, hypotension and signs of decreased cardiac output  - Evaluate effectiveness of vasoactive  medications to optimize hemodynamic stability  - Monitor arterial and/or venous puncture sites for bleeding and/or hematoma  - Assess quality of pulses, skin color and temperature  - Assess for signs of decreased coronary artery perfusion - ex. Angina  - Evaluate fluid balance, assess for edema, trend weights  Outcome: Progressing

## 2024-03-12 NOTE — PLAN OF CARE
Assumed care of pt at 0723. Pt A/Ox4. Pt on room air. LS clear. NSR on tele. Pt is normotensive. Pt is voiding freely. Pt has c/o tenderness of RUE. Pt has redness, warm to the touch, tenderness and swelling. Dr Hill and RUDDY Ku with Cardiology notified this am. Warm compresses for 15 mins QID and rt arm elevation on pillows was ordered. RUE arm doppler was ordered. Pt up in chair and ambulating in hallways. No complaints of chest pain or shortness of breath.  Pt is awaiting his Cardiac MRI scheduled for tomorrow 3/13 at 12:00pm. Pt ok to transfer to the floor. Pt and pt's family updated with plan of care. Report given to GAVIN Mahoney. Pt left in stable condition transferred to the floor with all belongings.     Problem: Diabetes/Glucose Control  Goal: Glucose maintained within prescribed range  Description: INTERVENTIONS:  - Monitor Blood Glucose as ordered  - Assess for signs and symptoms of hyperglycemia and hypoglycemia  - Administer ordered medications to maintain glucose within target range  - Assess barriers to adequate nutritional intake and initiate nutrition consult as needed  - Instruct patient on self management of diabetes  Outcome: Progressing     Problem: Patient/Family Goals  Goal: Patient/Family Long Term Goal  Description: Patient's Long Term Goal: To go home    Interventions:  - Trend labs/vs  - See additional Care Plan goals for specific interventions  Outcome: Progressing  Goal: Patient/Family Short Term Goal  Description: Patient's Short Term Goal: Pain management    Interventions:   - Reposition  - Take pain meds prn  - See additional Care Plan goals for specific interventions  Outcome: Progressing     Problem: CARDIOVASCULAR - ADULT  Goal: Maintains optimal cardiac output and hemodynamic stability  Description: INTERVENTIONS:  - Monitor vital signs, rhythm, and trends  - Monitor for bleeding, hypotension and signs of decreased cardiac output  - Evaluate effectiveness of vasoactive medications  to optimize hemodynamic stability  - Monitor arterial and/or venous puncture sites for bleeding and/or hematoma  - Assess quality of pulses, skin color and temperature  - Assess for signs of decreased coronary artery perfusion - ex. Angina  - Evaluate fluid balance, assess for edema, trend weights  Outcome: Progressing  Goal: Absence of cardiac arrhythmias or at baseline  Description: INTERVENTIONS:  - Continuous cardiac monitoring, monitor vital signs, obtain 12 lead EKG if indicated  - Evaluate effectiveness of antiarrhythmic and heart rate control medications as ordered  - Initiate emergency measures for life threatening arrhythmias  - Monitor electrolytes and administer replacement therapy as ordered  Outcome: Progressing     Problem: PAIN - ADULT  Goal: Verbalizes/displays adequate comfort level or patient's stated pain goal  Description: INTERVENTIONS:  - Encourage pt to monitor pain and request assistance  - Assess pain using appropriate pain scale  - Administer analgesics based on type and severity of pain and evaluate response  - Implement non-pharmacological measures as appropriate and evaluate response  - Consider cultural and social influences on pain and pain management  - Manage/alleviate anxiety  - Utilize distraction and/or relaxation techniques  - Monitor for opioid side effects  - Notify MD/LIP if interventions unsuccessful or patient reports new pain  - Anticipate increased pain with activity and pre-medicate as appropriate  Outcome: Progressing     Problem: RISK FOR INFECTION - ADULT  Goal: Absence of fever/infection during anticipated neutropenic period  Description: INTERVENTIONS  - Monitor WBC  - Administer growth factors as ordered  - Implement neutropenic guidelines  Outcome: Progressing     Problem: SAFETY ADULT - FALL  Goal: Free from fall injury  Description: INTERVENTIONS:  - Assess pt frequently for physical needs  - Identify cognitive and physical deficits and behaviors that affect  risk of falls.  - [place] fall precautions as indicated by assessment.  - Educate pt/family on patient safety including physical limitations  - Instruct pt to call for assistance with activity based on assessment  - Modify environment to reduce risk of injury  - Provide assistive devices as appropriate  - Consider OT/PT consult to assist with strengthening/mobility  - Encourage toileting schedule  Outcome: Progressing

## 2024-03-12 NOTE — PLAN OF CARE
Pt alert and oriented x4  Up standby no devices assist.  On RA  NSR with PVC on tele.  Swelling, redness, warm to touch on right arm, ultrasound doppler order by attending.  Continent of bowel and bladder.  No complaints of pain, sob, or chest pain/ discomfort.  Plan of care discussed with patient.  Fall precautions in place.  Call light within reach.    Problem: Diabetes/Glucose Control  Goal: Glucose maintained within prescribed range  Description: INTERVENTIONS:  - Monitor Blood Glucose as ordered  - Assess for signs and symptoms of hyperglycemia and hypoglycemia  - Administer ordered medications to maintain glucose within target range  - Assess barriers to adequate nutritional intake and initiate nutrition consult as needed  - Instruct patient on self management of diabetes  Outcome: Progressing     Problem: Patient/Family Goals  Goal: Patient/Family Long Term Goal  Description: Patient's Long Term Goal: To go home    Interventions:  - Trend labs/vs  - See additional Care Plan goals for specific interventions  Outcome: Progressing  Goal: Patient/Family Short Term Goal  Description: Patient's Short Term Goal: Pain management    Interventions:   - Reposition  - Take pain meds prn  - See additional Care Plan goals for specific interventions  Outcome: Progressing     Problem: CARDIOVASCULAR - ADULT  Goal: Maintains optimal cardiac output and hemodynamic stability  Description: INTERVENTIONS:  - Monitor vital signs, rhythm, and trends  - Monitor for bleeding, hypotension and signs of decreased cardiac output  - Evaluate effectiveness of vasoactive medications to optimize hemodynamic stability  - Monitor arterial and/or venous puncture sites for bleeding and/or hematoma  - Assess quality of pulses, skin color and temperature  - Assess for signs of decreased coronary artery perfusion - ex. Angina  - Evaluate fluid balance, assess for edema, trend weights  Outcome: Progressing  Goal: Absence of cardiac arrhythmias or at  baseline  Description: INTERVENTIONS:  - Continuous cardiac monitoring, monitor vital signs, obtain 12 lead EKG if indicated  - Evaluate effectiveness of antiarrhythmic and heart rate control medications as ordered  - Initiate emergency measures for life threatening arrhythmias  - Monitor electrolytes and administer replacement therapy as ordered  Outcome: Progressing     Problem: PAIN - ADULT  Goal: Verbalizes/displays adequate comfort level or patient's stated pain goal  Description: INTERVENTIONS:  - Encourage pt to monitor pain and request assistance  - Assess pain using appropriate pain scale  - Administer analgesics based on type and severity of pain and evaluate response  - Implement non-pharmacological measures as appropriate and evaluate response  - Consider cultural and social influences on pain and pain management  - Manage/alleviate anxiety  - Utilize distraction and/or relaxation techniques  - Monitor for opioid side effects  - Notify MD/LIP if interventions unsuccessful or patient reports new pain  - Anticipate increased pain with activity and pre-medicate as appropriate  Outcome: Progressing     Problem: RISK FOR INFECTION - ADULT  Goal: Absence of fever/infection during anticipated neutropenic period  Description: INTERVENTIONS  - Monitor WBC  - Administer growth factors as ordered  - Implement neutropenic guidelines  Outcome: Progressing     Problem: SAFETY ADULT - FALL  Goal: Free from fall injury  Description: INTERVENTIONS:  - Assess pt frequently for physical needs  - Identify cognitive and physical deficits and behaviors that affect risk of falls.  - [place] fall precautions as indicated by assessment.  - Educate pt/family on patient safety including physical limitations  - Instruct pt to call for assistance with activity based on assessment  - Modify environment to reduce risk of injury  - Provide assistive devices as appropriate  - Consider OT/PT consult to assist with  strengthening/mobility  - Encourage toileting schedule  Outcome: Progressing

## 2024-03-12 NOTE — PROGRESS NOTES
[facility]   part of [facility]     Hospitalist Progress Note     Jalen Jeronimo Patient Status:  Inpatient    3/3/1960 MRN [id number]   [facility] 6NE-A Attending Constanza Hill MD   Hosp Day # 4 PCP Tommy Allen     Chief Complaint: cp    Subjective:     Patient denies sob cp    Objective:    Review of Systems:   A comprehensive review of systems was completed; pertinent positive and negatives stated in subjective.    Vital signs:  Temp:  [97.3 °F (36.3 °C)-98.1 °F (36.7 °C)] 97.6 °F (36.4 °C)  Pulse:  [67-82] 78  Resp:  [14-27] 20  BP: (100-142)/(55-81) 129/79  SpO2:  [95 %-100 %] 98 %    Physical Exam:    General: No acute distress  Respiratory: No wheezes, no rhonchi  Cardiovascular: S1, S2, regular rate and rhythm  Abdomen: Soft, Non-tender, non-distended, positive bowel sounds  Neuro: No new focal deficits.   Extremities: No edema      Diagnostic Data:    Labs:  Recent Labs   Lab 24  0457   WBC 10.3  --  8.2   HGB 14.6  --  12.7*   MCV 60.6*  --  60.2*   PLT 45.0*  --  57.0*   INR  --  1.07  --        Recent Labs   Lab 247 24  0433   * 157* 151*   BUN 13 15 16   CREATSERUM 1.07 0.81 1.10   CA 9.5 8.9 9.5   ALB 3.9 3.3*  --     139 137   K 3.7 3.9 4.0    111 107   CO2 20.0* 23.0 27.0   ALKPHO 60 51  --    AST 31 28  --    ALT 45 44  --    BILT 1.6 1.6  --    TP 7.5 6.3*  --        Estimated Creatinine Clearance: 63.9 mL/min (based on SCr of 1.1 mg/dL).    Recent Labs   Lab 24   TROPHS 12       Recent Labs   Lab 24   PTP 13.9   INR 1.07                  Microbiology    No results found for this visit on 24.      Imaging: Reviewed in Epic.    Medications:    metoprolol tartrate  50 mg Oral 2x Daily(Beta Blocker)    amiodarone  400 mg Oral TID    potassium chloride  40 mEq Oral Daily    insulin aspart  1-68 Units Subcutaneous TID CC    insulin aspart  1-30 Units  Subcutaneous TID AC and HS       Assessment & Plan:      #V. Tach-sustained MM  Aicd by EP  Heart mri  Stable on Amiodarone  LVEF normal  MRI in am       #Diabetes  -Hyperglycemia protocol     #Hypertension  -Lisinopril     #Hyperlipidemia  -Statin     Plan of care discussed with patient      Constanza Hill MD    Supplementary Documentation:     Quality:  DVT Mechanical Prophylaxis:   SCDs,    DVT Pharmacologic Prophylaxis   Medication   None                Code Status: Not on file  Lim: No urinary catheter in place  Lim Duration (in days):   Central line:    VJ:     Discharge is dependent on: progress  At this point Mr. Jeronimo is expected to be discharge to: home    The 21st Century Cures Act makes medical notes like these available to patients in the interest of transparency. Please be advised this is a medical document. Medical documents are intended to carry relevant information, facts as evident, and the clinical opinion of the practitioner. The medical note is intended as peer to peer communication and may appear blunt or direct. It is written in medical language and may contain abbreviations or verbiage that are unfamiliar.             **Certification      PHYSICIAN Certification of Need for Inpatient Hospitalization - Initial Certification    Patient will require inpatient services that will reasonably be expected to span two midnight's based on the clinical documentation in H+P.   Based on patients current state of illness, I anticipate that, after discharge, patient will require TBD.

## 2024-03-12 NOTE — PROGRESS NOTES
[facility]   part of [facility]     Cardiology Progress Note        Jalen Jeronimo Patient Status:  Inpatient    3/3/1960 MRN [id number]   Location [facility] 6NE-A Attending Constanza Hill MD   Hosp Day # 4 PCP Tommy Allen         Subjective:    Having RUE discomfort at side of amio infusion and infiltration. No other complaints.     Objective:  /59   Pulse 74   Temp 97.6 °F (36.4 °C) (Temporal)   Resp 18   Ht 5' 8\" (1.727 m)   Wt 286 lb 2.5 oz (129.8 kg)   SpO2 99%   BMI 43.51 kg/m²     Temp (24hrs), Av.7 °F (36.5 °C), Min:97.3 °F (36.3 °C), Max:98.1 °F (36.7 °C)      Tele  Sr with occ pvc, couplet    Intake/Output:    Intake/Output Summary (Last 24 hours) at 3/12/2024 0926  Last data filed at 3/11/2024 1241  Gross per 24 hour   Intake 878.21 ml   Output --   Net 878.21 ml       Patient Weight for the past 72 hrs:   Weight   24 285 lb (129.3 kg)   24 2243 292 lb 12.3 oz (132.8 kg)   24 0400 292 lb 12.3 oz (132.8 kg)   03/10/24 0600 287 lb 12.8 oz (130.5 kg)   24 0400 288 lb 5.8 oz (130.8 kg)        Physical Exam:    Gen: alert, oriented x 3, NAD  Heent: pupils equal, reactive. Mucous membranes moist.   Neck: no jvd  Cardiac: regular rate and rhythm, normal S1,S2  Lungs: CTA  Abd: soft, NT/ND +bs  Ext: no edema. RUE erythemic. Firm area above AC where amio infiltated  Skin: Warm, dry  Neuro: No focal deficits    Labs:  Lab Results   Component Value Date    CREATSERUM 1.10 2024    BUN 16 2024     2024    K 4.0 2024     2024    CO2 27.0 2024     2024    CA 9.5 2024    MG 2.0 2024           Medications:     amiodarone  400 mg Oral TID    potassium chloride  40 mEq Oral Daily    insulin aspart  1-68 Units Subcutaneous TID CC    insulin aspart  1-30 Units Subcutaneous TID AC and HS    metoprolol tartrate  25 mg Oral QID Beta Blocker           Assessment:    Sustained monomorphic  ventricular tachycardia s/p 3 defibrillator shocks in ER to convert back to sr  No recurrence on amio which has been transitioned to PO  LHC 3/8/24 without cad  Echo with preserved lvef, no evidence of hocm. Rv function normal  Cardiac MRI pending - scheduled for Wednesday  RUE phlebitis due to amio infusion/infiltration.    DM    Plan:     Cardiac MRI to assess for infiltrative heart disease, RV dysplasia, scar formation- scheduled for Wednesday per MRI dept  EKG this am   Change lopressor to bid from qid  Will discuss phlebitis with Dr Hill - hold on abx for now  Plan for ICD prior to dc  Ok to transfer to ctu    Discussed plan of care with patient and nursing.       Rosemarie Simpson, MYRANDA  [phone]       Chart reveiwed and decision making performed in entirety with discussion with staff. Agree with above note and assessment with the following additions made below. Agree with above note and assessment with the following additions made below.   Continue po amiodarone.   Cardiac MRI in am.   Plan for ICD tomorrow after MRI or Thursday.   Discussed risks, benefits and details of procedure with pt and wife. Also reviewed risk of bleeding with Plts in ~40-50K range, should still be ok to proceed with implant.     Dorothea Hidalgo MD  [facility]  Cardiac Electrophysiolgy

## 2024-03-13 ENCOUNTER — APPOINTMENT (OUTPATIENT)
Dept: INTERVENTIONAL RADIOLOGY/VASCULAR | Facility: HOSPITAL | Age: 64
End: 2024-03-13
Attending: INTERNAL MEDICINE
Payer: MEDICARE

## 2024-03-13 ENCOUNTER — APPOINTMENT (OUTPATIENT)
Dept: MRI IMAGING | Facility: HOSPITAL | Age: 64
End: 2024-03-13
Attending: NURSE PRACTITIONER
Payer: MEDICARE

## 2024-03-13 LAB
ANION GAP SERPL CALC-SCNC: 6 MMOL/L (ref 0–18)
ATRIAL RATE: 72 BPM
BASOPHILS # BLD AUTO: 0.04 X10(3) UL (ref 0–0.2)
BASOPHILS NFR BLD AUTO: 0.5 %
BUN BLD-MCNC: 15 MG/DL (ref 9–23)
CALCIUM BLD-MCNC: 9.7 MG/DL (ref 8.5–10.1)
CHLORIDE SERPL-SCNC: 107 MMOL/L (ref 98–112)
CO2 SERPL-SCNC: 26 MMOL/L (ref 21–32)
CREAT BLD-MCNC: 0.87 MG/DL
EGFRCR SERPLBLD CKD-EPI 2021: 95 ML/MIN/1.73M2 (ref 60–?)
EOSINOPHIL # BLD AUTO: 0.22 X10(3) UL (ref 0–0.7)
EOSINOPHIL NFR BLD AUTO: 2.7 %
ERYTHROCYTE [DISTWIDTH] IN BLOOD BY AUTOMATED COUNT: 18.4 %
GLUCOSE BLD-MCNC: 127 MG/DL (ref 70–99)
GLUCOSE BLD-MCNC: 140 MG/DL (ref 70–99)
GLUCOSE BLD-MCNC: 152 MG/DL (ref 70–99)
GLUCOSE BLD-MCNC: 164 MG/DL (ref 70–99)
GLUCOSE BLD-MCNC: 178 MG/DL (ref 70–99)
HCT VFR BLD AUTO: 42.2 %
HGB BLD-MCNC: 13.4 G/DL
IMM GRANULOCYTES # BLD AUTO: 0.03 X10(3) UL (ref 0–1)
IMM GRANULOCYTES NFR BLD: 0.4 %
LYMPHOCYTES # BLD AUTO: 1.4 X10(3) UL (ref 1–4)
LYMPHOCYTES NFR BLD AUTO: 17.2 %
MAGNESIUM SERPL-MCNC: 2 MG/DL (ref 1.6–2.6)
MCH RBC QN AUTO: 18.8 PG (ref 26–34)
MCHC RBC AUTO-ENTMCNC: 31.8 G/DL (ref 31–37)
MCV RBC AUTO: 59.4 FL
MONOCYTES # BLD AUTO: 0.7 X10(3) UL (ref 0.1–1)
MONOCYTES NFR BLD AUTO: 8.6 %
NEUTROPHILS # BLD AUTO: 5.74 X10 (3) UL (ref 1.5–7.7)
NEUTROPHILS # BLD AUTO: 5.74 X10(3) UL (ref 1.5–7.7)
NEUTROPHILS NFR BLD AUTO: 70.6 %
OSMOLALITY SERPL CALC.SUM OF ELEC: 292 MOSM/KG (ref 275–295)
P AXIS: 37 DEGREES
P-R INTERVAL: 156 MS
PLATELET # BLD AUTO: 60 10(3)UL (ref 150–450)
POTASSIUM SERPL-SCNC: 4.2 MMOL/L (ref 3.5–5.1)
Q-T INTERVAL: 418 MS
QRS DURATION: 104 MS
QTC CALCULATION (BEZET): 457 MS
R AXIS: -68 DEGREES
RBC # BLD AUTO: 7.11 X10(6)UL
SODIUM SERPL-SCNC: 139 MMOL/L (ref 136–145)
T AXIS: 36 DEGREES
VENTRICULAR RATE: 72 BPM
WBC # BLD AUTO: 8.1 X10(3) UL (ref 4–11)

## 2024-03-13 PROCEDURE — 99232 SBSQ HOSP IP/OBS MODERATE 35: CPT | Performed by: STUDENT IN AN ORGANIZED HEALTH CARE EDUCATION/TRAINING PROGRAM

## 2024-03-13 PROCEDURE — 02H63KZ INSERTION OF DEFIBRILLATOR LEAD INTO RIGHT ATRIUM, PERCUTANEOUS APPROACH: ICD-10-PCS | Performed by: INTERNAL MEDICINE

## 2024-03-13 PROCEDURE — B51NYZZ FLUOROSCOPY OF LEFT UPPER EXTREMITY VEINS USING OTHER CONTRAST: ICD-10-PCS | Performed by: INTERNAL MEDICINE

## 2024-03-13 PROCEDURE — 0JH608Z INSERTION OF DEFIBRILLATOR GENERATOR INTO CHEST SUBCUTANEOUS TISSUE AND FASCIA, OPEN APPROACH: ICD-10-PCS | Performed by: INTERNAL MEDICINE

## 2024-03-13 PROCEDURE — 02HK3KZ INSERTION OF DEFIBRILLATOR LEAD INTO RIGHT VENTRICLE, PERCUTANEOUS APPROACH: ICD-10-PCS | Performed by: INTERNAL MEDICINE

## 2024-03-13 PROCEDURE — 75561 CARDIAC MRI FOR MORPH W/DYE: CPT | Performed by: NURSE PRACTITIONER

## 2024-03-13 RX ORDER — VANCOMYCIN HYDROCHLORIDE 1 G/20ML
INJECTION, POWDER, LYOPHILIZED, FOR SOLUTION INTRAVENOUS
Status: COMPLETED
Start: 2024-03-13 | End: 2024-03-13

## 2024-03-13 RX ORDER — CEFAZOLIN SODIUM IN 0.9 % NACL 3 G/100 ML
3 INTRAVENOUS SOLUTION, PIGGYBACK (ML) INTRAVENOUS EVERY 8 HOURS
Qty: 200 ML | Refills: 0 | Status: COMPLETED | OUTPATIENT
Start: 2024-03-13 | End: 2024-03-14

## 2024-03-13 RX ORDER — MIDAZOLAM HYDROCHLORIDE 1 MG/ML
INJECTION INTRAMUSCULAR; INTRAVENOUS
Status: COMPLETED
Start: 2024-03-13 | End: 2024-03-13

## 2024-03-13 RX ORDER — CEFAZOLIN SODIUM/WATER 2 G/20 ML
SYRINGE (ML) INTRAVENOUS
Status: COMPLETED
Start: 2024-03-13 | End: 2024-03-13

## 2024-03-13 RX ORDER — LIDOCAINE HYDROCHLORIDE 10 MG/ML
INJECTION, SOLUTION EPIDURAL; INFILTRATION; INTRACAUDAL; PERINEURAL
Status: COMPLETED
Start: 2024-03-13 | End: 2024-03-13

## 2024-03-13 RX ORDER — WATER 10 ML/10ML
INJECTION INTRAMUSCULAR; INTRAVENOUS; SUBCUTANEOUS
Status: COMPLETED
Start: 2024-03-13 | End: 2024-03-13

## 2024-03-13 RX ORDER — ONDANSETRON 2 MG/ML
4 INJECTION INTRAMUSCULAR; INTRAVENOUS EVERY 6 HOURS PRN
Status: DISCONTINUED | OUTPATIENT
Start: 2024-03-13 | End: 2024-03-14

## 2024-03-13 NOTE — PROCEDURES
[facility]  Device Implantation Procedure Note    Jalen Jeronimo Patient Status:  Inpatient    3/3/1960 MRN [id number]   [facility] INTERVENTIONAL SUITES Attending Constanza Hill MD   Hosp Day # 5 BRANDT Allen     OPERATION(S) PERFORMED:   1.  Dual-chamber ICD. Device - Abbott   2. Chest fluoroscopy.   3. Left upper extremity venography.      : Brayan Babin MD  INDICATION: Aborted sudden cardiac death-ventricular tachycardia  COMPLICATIONS: None      ESTIMATED BLOOD LOSS: Minimal.  SEDATION: IV was maintained by RN. Patient was assessed by myself and the nursing staff, IV sedation (versed 6 mg and fentanyl 200 micrograms) were administered during continuous ECG, pulse oximeter, and non-invasive hemodynamic monitoring. I was present from the time of sedation being started to the end of the procedure (16 15-).          METHODS: The patient was brought to the EP lab in a fasting and nonsedated state after providing informed consent. IV sedation was administered during continuous ECG, pulse oximeter, and noninvasive hemodynamic monitoring. After administering 1% lidocaine for local anesthesia, an incision was made parallel to the left clavicle. The plane of the incision was extended to the prepectoral fascia. A pocket was formed.   Access to the axillary vein was achieved via the extrathoracic approach with 2 separate punctures.The guidewires were advanced to the IVC.     A ventricular lead was positioned in the RV apex.  Local electrograms and pacing thresholds were measured. Pacing was performed at 10 v to rule out phrenic nerve stimulation.      An atrial lead was positioned in the RA appendage. Local electrograms and pacing thresholds were measured. Pacing was performed at 10 v to rule out phrenic nerve stimulation.       The leads were tied to the pre-pectoral fascia with 2-0 Ethibond.      The pocket was irrigated with antibiotic solution. Bleeding was  sought for until hemostasis was achieved. The leads were connected to a pulse generator. They were coiled and placed into the pocket along with the pulse generator. The incision was closed in 3 layers using 2-0 and 3-0 Vicryl and a 4-0 subcuticular.   Steri-Strips were applied followed by a sterile dressing. The left arm was placed in a sling and the patient was transported to telemetry in stable condition. There were no apparent intraoperative complications.            CONCLUSIONS:   1. Status post successful implant of a Abbott dual-chamber ICD for aborted cardiac arrest.  Class I heart failure   2. Adequate RA, RV pacing and sensing thresholds.   3.  PA and lateral chest x-ray tomorrow morning.         CARL Cerda  Cardiac Electrophysiology  [facility]  3/13/2024  5:29 PM

## 2024-03-13 NOTE — PROGRESS NOTES
[facility] Cardiology Progress Note    Jalen Jeronimo Patient Status:  Inpatient    3/3/1960 MRN [id number]   Location [facility] 2NE-A Attending Constanza Hill MD   Hosp Day # 5 PCP Tommy Allen     Subjective:  No acute events overnight per chart review  Patient currently at MRI    Objective:  /57 (BP Location: Left arm)   Pulse 68   Temp 98.2 °F (36.8 °C) (Oral)   Resp 18   Ht 5' 8\" (1.727 m)   Wt 286 lb 2.5 oz (129.8 kg)   SpO2 95%   BMI 43.51 kg/m²     Telemetry: nsr. Single triplet       Intake/Output:    Intake/Output Summary (Last 24 hours) at 3/13/2024 1254  Last data filed at 3/13/2024 1034  Gross per 24 hour   Intake 600 ml   Output 0 ml   Net 600 ml       Last 3 Weights   24 0600 286 lb 2.5 oz (129.8 kg)   24 0400 288 lb 5.8 oz (130.8 kg)   03/10/24 0600 287 lb 12.8 oz (130.5 kg)   24 0400 292 lb 12.3 oz (132.8 kg)   24 2243 292 lb 12.3 oz (132.8 kg)   24 285 lb (129.3 kg)   20 1019 (!) 302 lb (137 kg)       Labs:  Recent Labs   Lab 24  04524  0433 24  0644   * 151* 164*   BUN 15 16 15   CREATSERUM 0.81 1.10 0.87   EGFRCR 97 74 95   CA 8.9 9.5 9.7    137 139   K 3.9 4.0 4.2    107 107   CO2 23.0 27.0 26.0     Recent Labs   Lab 24  0457 24  1015   RBC 7.67* 6.79* 7.11*   HGB 14.6 12.7* 13.4   HCT 46.5 40.9 42.2   MCV 60.6* 60.2* 59.4*   MCH 19.0* 18.7* 18.8*   MCHC 31.4 31.1 31.8   RDW 19.3 18.5 18.4   NEPRELIM 4.87 5.34 5.74   WBC 10.3 8.2 8.1   PLT 45.0* 57.0* 60.0*         Recent Labs   Lab 24   TROPHS 12       Diagnostics:             Physical Exam:    Physical Exam    Medications:   metoprolol tartrate  50 mg Oral 2x Daily(Beta Blocker)    amiodarone  400 mg Oral TID    potassium chloride  40 mEq Oral Daily    insulin aspart  1-68 Units Subcutaneous TID CC    insulin aspart  1-30 Units Subcutaneous TID AC and HS         Assessment:  65 yo presenting with  chest pain and LH, was in VT on presentation.     Sustained Monomorphic Ventricular Tachycardia: SR after 3 shocks, EF 60%, no sig CAD on [facility] 3/8, MRI pending for assessment of possible infiltrative ds, then ICD before discharge, if not ICD then needs Wearable defibrillator on discharge, completed IV amio load and now on oral amio loading , Qtc stable yesterday   ITP: plt 45-60  DM2     Plan:    MRI results pending  Plan for  ICD, possibly later today.     Teri Avila, APRN  3/13/2024  12:54 PM  Ph [phone] ([place])  Ph [phone] ([place])    ____________________________  Pt s data reviewed and discussed with the APN.    Tele: SR      I have personally performed the medical decision making in its entirety. My additions include:  Plan  - DC- ICD today.MRI completed. Results could impact Treatments for his VT, but an ICD is indicated for sustained VT    R3    ____________________________  Brayan Babin MD, FACC, RS  Cardiac Electrophysiololgy  [facility]  3/13/2024

## 2024-03-13 NOTE — PLAN OF CARE
Assumed care around 1930. A/Ox4. On RA w/ sats >90. Monitor shows NSR. Continent of bowel and bladder. No reports of pain. Up SBA/ad jose manuel in the room, steady gait noted. Plan for cardiac MRI, poss ICD placement. Safety precautions in place, call light within reach.     Problem: Diabetes/Glucose Control  Goal: Glucose maintained within prescribed range  Description: INTERVENTIONS:  - Monitor Blood Glucose as ordered  - Assess for signs and symptoms of hyperglycemia and hypoglycemia  - Administer ordered medications to maintain glucose within target range  - Assess barriers to adequate nutritional intake and initiate nutrition consult as needed  - Instruct patient on self management of diabetes  Outcome: Progressing     Problem: Patient/Family Goals  Goal: Patient/Family Long Term Goal  Description: Patient's Long Term Goal: To go home    Interventions:  - Trend labs/vs  - See additional Care Plan goals for specific interventions  Outcome: Progressing  Goal: Patient/Family Short Term Goal  Description: Patient's Short Term Goal: Pain management    Interventions:   - Reposition  - Take pain meds prn  - See additional Care Plan goals for specific interventions  Outcome: Progressing     Problem: CARDIOVASCULAR - ADULT  Goal: Maintains optimal cardiac output and hemodynamic stability  Description: INTERVENTIONS:  - Monitor vital signs, rhythm, and trends  - Monitor for bleeding, hypotension and signs of decreased cardiac output  - Evaluate effectiveness of vasoactive medications to optimize hemodynamic stability  - Monitor arterial and/or venous puncture sites for bleeding and/or hematoma  - Assess quality of pulses, skin color and temperature  - Assess for signs of decreased coronary artery perfusion - ex. Angina  - Evaluate fluid balance, assess for edema, trend weights  Outcome: Progressing  Goal: Absence of cardiac arrhythmias or at baseline  Description: INTERVENTIONS:  - Continuous cardiac monitoring, monitor vital  signs, obtain 12 lead EKG if indicated  - Evaluate effectiveness of antiarrhythmic and heart rate control medications as ordered  - Initiate emergency measures for life threatening arrhythmias  - Monitor electrolytes and administer replacement therapy as ordered  Outcome: Progressing     Problem: PAIN - ADULT  Goal: Verbalizes/displays adequate comfort level or patient's stated pain goal  Description: INTERVENTIONS:  - Encourage pt to monitor pain and request assistance  - Assess pain using appropriate pain scale  - Administer analgesics based on type and severity of pain and evaluate response  - Implement non-pharmacological measures as appropriate and evaluate response  - Consider cultural and social influences on pain and pain management  - Manage/alleviate anxiety  - Utilize distraction and/or relaxation techniques  - Monitor for opioid side effects  - Notify MD/LIP if interventions unsuccessful or patient reports new pain  - Anticipate increased pain with activity and pre-medicate as appropriate  Outcome: Progressing     Problem: RISK FOR INFECTION - ADULT  Goal: Absence of fever/infection during anticipated neutropenic period  Description: INTERVENTIONS  - Monitor WBC  - Administer growth factors as ordered  - Implement neutropenic guidelines  Outcome: Progressing     Problem: SAFETY ADULT - FALL  Goal: Free from fall injury  Description: INTERVENTIONS:  - Assess pt frequently for physical needs  - Identify cognitive and physical deficits and behaviors that affect risk of falls.  - [place] fall precautions as indicated by assessment.  - Educate pt/family on patient safety including physical limitations  - Instruct pt to call for assistance with activity based on assessment  - Modify environment to reduce risk of injury  - Provide assistive devices as appropriate  - Consider OT/PT consult to assist with strengthening/mobility  - Encourage toileting schedule  Outcome: Progressing

## 2024-03-13 NOTE — PLAN OF CARE
Pt alert and oriented x4.  Up standby/no device.  On RA.  NSR with occasional PVC on tele.  Continent of bowel and  bladder.  Swelling, redness and warm to touch on right arm.  No complaints of pain, sob, or chest pain/ discomfort.  Plan of care discussed with patient.  Fall precautions in place.  Call light within reach.    Problem: Diabetes/Glucose Control  Goal: Glucose maintained within prescribed range  Description: INTERVENTIONS:  - Monitor Blood Glucose as ordered  - Assess for signs and symptoms of hyperglycemia and hypoglycemia  - Administer ordered medications to maintain glucose within target range  - Assess barriers to adequate nutritional intake and initiate nutrition consult as needed  - Instruct patient on self management of diabetes  Outcome: Progressing     Problem: Patient/Family Goals  Goal: Patient/Family Long Term Goal  Description: Patient's Long Term Goal: To go home    Interventions:  - Trend labs/vs  - See additional Care Plan goals for specific interventions  Outcome: Progressing  Goal: Patient/Family Short Term Goal  Description: Patient's Short Term Goal: Pain management    Interventions:   - Reposition  - Take pain meds prn  - See additional Care Plan goals for specific interventions  Outcome: Progressing     Problem: CARDIOVASCULAR - ADULT  Goal: Maintains optimal cardiac output and hemodynamic stability  Description: INTERVENTIONS:  - Monitor vital signs, rhythm, and trends  - Monitor for bleeding, hypotension and signs of decreased cardiac output  - Evaluate effectiveness of vasoactive medications to optimize hemodynamic stability  - Monitor arterial and/or venous puncture sites for bleeding and/or hematoma  - Assess quality of pulses, skin color and temperature  - Assess for signs of decreased coronary artery perfusion - ex. Angina  - Evaluate fluid balance, assess for edema, trend weights  Outcome: Progressing  Goal: Absence of cardiac arrhythmias or at baseline  Description:  INTERVENTIONS:  - Continuous cardiac monitoring, monitor vital signs, obtain 12 lead EKG if indicated  - Evaluate effectiveness of antiarrhythmic and heart rate control medications as ordered  - Initiate emergency measures for life threatening arrhythmias  - Monitor electrolytes and administer replacement therapy as ordered  Outcome: Progressing     Problem: PAIN - ADULT  Goal: Verbalizes/displays adequate comfort level or patient's stated pain goal  Description: INTERVENTIONS:  - Encourage pt to monitor pain and request assistance  - Assess pain using appropriate pain scale  - Administer analgesics based on type and severity of pain and evaluate response  - Implement non-pharmacological measures as appropriate and evaluate response  - Consider cultural and social influences on pain and pain management  - Manage/alleviate anxiety  - Utilize distraction and/or relaxation techniques  - Monitor for opioid side effects  - Notify MD/LIP if interventions unsuccessful or patient reports new pain  - Anticipate increased pain with activity and pre-medicate as appropriate  Outcome: Progressing     Problem: RISK FOR INFECTION - ADULT  Goal: Absence of fever/infection during anticipated neutropenic period  Description: INTERVENTIONS  - Monitor WBC  - Administer growth factors as ordered  - Implement neutropenic guidelines  Outcome: Progressing     Problem: SAFETY ADULT - FALL  Goal: Free from fall injury  Description: INTERVENTIONS:  - Assess pt frequently for physical needs  - Identify cognitive and physical deficits and behaviors that affect risk of falls.  - [place] fall precautions as indicated by assessment.  - Educate pt/family on patient safety including physical limitations  - Instruct pt to call for assistance with activity based on assessment  - Modify environment to reduce risk of injury  - Provide assistive devices as appropriate  - Consider OT/PT consult to assist with strengthening/mobility  - Encourage  toileting schedule  Outcome: Progressing

## 2024-03-13 NOTE — PROGRESS NOTES
[facility]   part of [facility]     Hospitalist Progress Note     Jalen Jeronimo Patient Status:  Inpatient    3/3/1960 MRN [id number]   Location [facility] 6NE-A Attending Constanza Hill MD   Hosp Day # 5 PCP Tommy Allen     Chief Complaint: cp    Subjective:     Patient doing well, no pain    Objective:    Review of Systems:   A comprehensive review of systems was completed; pertinent positive and negatives stated in subjective.    Vital signs:  Temp:  [97.5 °F (36.4 °C)-98.2 °F (36.8 °C)] 98.2 °F (36.8 °C)  Pulse:  [67-80] 68  Resp:  [9-21] 18  BP: ()/(54-74) 113/57  SpO2:  [95 %-98 %] 95 %    Physical Exam:    General: No acute distress  Respiratory: No wheezes, no rhonchi  Cardiovascular: S1, S2, regular rate and rhythm  Abdomen: Soft, Non-tender, non-distended, positive bowel sounds  Neuro: No new focal deficits.   Extremities: RUE swelling ,erythema subsiding       Diagnostic Data:    Labs:  Recent Labs   Lab 24  0457   WBC 10.3  --  8.2   HGB 14.6  --  12.7*   MCV 60.6*  --  60.2*   PLT 45.0*  --  57.0*   INR  --  1.07  --        Recent Labs   Lab 24  0457 24  0433 24  0644   * 157* 151* 164*   BUN 13 15 16 15   CREATSERUM 1.07 0.81 1.10 0.87   CA 9.5 8.9 9.5 9.7   ALB 3.9 3.3*  --   --     139 137 139   K 3.7 3.9 4.0 4.2    111 107 107   CO2 20.0* 23.0 27.0 26.0   ALKPHO 60 51  --   --    AST 31 28  --   --    ALT 45 44  --   --    BILT 1.6 1.6  --   --    TP 7.5 6.3*  --   --        Estimated Creatinine Clearance: 80.8 mL/min (based on SCr of 0.87 mg/dL).    Recent Labs   Lab 24   TROPHS 12       Recent Labs   Lab 24   PTP 13.9   INR 1.07                  Microbiology    No results found for this visit on 24.      Imaging: Reviewed in Epic.    Medications:    metoprolol tartrate  50 mg Oral 2x Daily(Beta Blocker)    amiodarone  400 mg Oral TID    potassium chloride   40 mEq Oral Daily    insulin aspart  1-68 Units Subcutaneous TID CC    insulin aspart  1-30 Units Subcutaneous TID AC and HS       Assessment & Plan:      #V. Tach-sustained MM  Aicd by EP- CBC today   Heart mri 3/13  Stable on Amiodarone  LVEF normal         #Diabetes  -Hyperglycemia protocol     #Hypertension  -Lisinopril     #Hyperlipidemia  -Statin     Plan of care discussed with patient      Constanza Hill MD    Supplementary Documentation:     Quality:  DVT Mechanical Prophylaxis:   SCDs,    DVT Pharmacologic Prophylaxis   Medication   None                Code Status: Not on file  Lim: No urinary catheter in place  Lim Duration (in days):   Central line:    VJ: 3/15/2024    Discharge is dependent on: progress  At this point Mr. Jeronimo is expected to be discharge to: home    The 21st Century Cures Act makes medical notes like these available to patients in the interest of transparency. Please be advised this is a medical document. Medical documents are intended to carry relevant information, facts as evident, and the clinical opinion of the practitioner. The medical note is intended as peer to peer communication and may appear blunt or direct. It is written in medical language and may contain abbreviations or verbiage that are unfamiliar.

## 2024-03-14 ENCOUNTER — APPOINTMENT (OUTPATIENT)
Dept: GENERAL RADIOLOGY | Facility: HOSPITAL | Age: 64
End: 2024-03-14
Attending: INTERNAL MEDICINE
Payer: MEDICARE

## 2024-03-14 VITALS
RESPIRATION RATE: 20 BRPM | DIASTOLIC BLOOD PRESSURE: 78 MMHG | WEIGHT: 286.19 LBS | HEIGHT: 68 IN | TEMPERATURE: 98 F | HEART RATE: 71 BPM | BODY MASS INDEX: 43.37 KG/M2 | SYSTOLIC BLOOD PRESSURE: 165 MMHG | OXYGEN SATURATION: 97 %

## 2024-03-14 LAB
ANION GAP SERPL CALC-SCNC: 5 MMOL/L (ref 0–18)
BASOPHILS # BLD AUTO: 0.04 X10(3) UL (ref 0–0.2)
BASOPHILS NFR BLD AUTO: 0.5 %
BUN BLD-MCNC: 16 MG/DL (ref 9–23)
CALCIUM BLD-MCNC: 9.2 MG/DL (ref 8.5–10.1)
CHLORIDE SERPL-SCNC: 104 MMOL/L (ref 98–112)
CO2 SERPL-SCNC: 25 MMOL/L (ref 21–32)
CREAT BLD-MCNC: 0.93 MG/DL
EGFRCR SERPLBLD CKD-EPI 2021: 91 ML/MIN/1.73M2 (ref 60–?)
EOSINOPHIL # BLD AUTO: 0.22 X10(3) UL (ref 0–0.7)
EOSINOPHIL NFR BLD AUTO: 2.9 %
ERYTHROCYTE [DISTWIDTH] IN BLOOD BY AUTOMATED COUNT: 18.4 %
GLUCOSE BLD-MCNC: 146 MG/DL (ref 70–99)
GLUCOSE BLD-MCNC: 154 MG/DL (ref 70–99)
HCT VFR BLD AUTO: 40.7 %
HGB BLD-MCNC: 13.2 G/DL
IMM GRANULOCYTES # BLD AUTO: 0.04 X10(3) UL (ref 0–1)
IMM GRANULOCYTES NFR BLD: 0.5 %
LYMPHOCYTES # BLD AUTO: 1.31 X10(3) UL (ref 1–4)
LYMPHOCYTES NFR BLD AUTO: 17.2 %
MAGNESIUM SERPL-MCNC: 1.9 MG/DL (ref 1.6–2.6)
MCH RBC QN AUTO: 19.3 PG (ref 26–34)
MCHC RBC AUTO-ENTMCNC: 32.4 G/DL (ref 31–37)
MCV RBC AUTO: 59.6 FL
MONOCYTES # BLD AUTO: 0.74 X10(3) UL (ref 0.1–1)
MONOCYTES NFR BLD AUTO: 9.7 %
NEUTROPHILS # BLD AUTO: 5.28 X10 (3) UL (ref 1.5–7.7)
NEUTROPHILS # BLD AUTO: 5.28 X10(3) UL (ref 1.5–7.7)
NEUTROPHILS NFR BLD AUTO: 69.2 %
OSMOLALITY SERPL CALC.SUM OF ELEC: 282 MOSM/KG (ref 275–295)
PLATELET # BLD AUTO: 64 10(3)UL (ref 150–450)
POTASSIUM SERPL-SCNC: 4.1 MMOL/L (ref 3.5–5.1)
RBC # BLD AUTO: 6.83 X10(6)UL
SODIUM SERPL-SCNC: 134 MMOL/L (ref 136–145)
WBC # BLD AUTO: 7.6 X10(3) UL (ref 4–11)

## 2024-03-14 PROCEDURE — 71046 X-RAY EXAM CHEST 2 VIEWS: CPT | Performed by: INTERNAL MEDICINE

## 2024-03-14 PROCEDURE — 99239 HOSP IP/OBS DSCHRG MGMT >30: CPT | Performed by: STUDENT IN AN ORGANIZED HEALTH CARE EDUCATION/TRAINING PROGRAM

## 2024-03-14 RX ORDER — METOPROLOL TARTRATE 50 MG/1
50 TABLET, FILM COATED ORAL
Qty: 60 TABLET | Refills: 3 | Status: SHIPPED | OUTPATIENT
Start: 2024-03-14

## 2024-03-14 RX ORDER — HYDROCODONE BITARTRATE AND ACETAMINOPHEN 5; 325 MG/1; MG/1
1 TABLET ORAL EVERY 6 HOURS PRN
Qty: 6 TABLET | Refills: 0 | Status: SHIPPED | OUTPATIENT
Start: 2024-03-14

## 2024-03-14 RX ORDER — CEPHALEXIN 500 MG/1
500 CAPSULE ORAL 4 TIMES DAILY
Qty: 48 CAPSULE | Refills: 0 | Status: SHIPPED | OUTPATIENT
Start: 2024-03-14 | End: 2024-03-26

## 2024-03-14 RX ORDER — AMIODARONE HYDROCHLORIDE 200 MG/1
400 TABLET ORAL 2 TIMES DAILY
Qty: 46 TABLET | Refills: 1 | Status: SHIPPED | OUTPATIENT
Start: 2024-03-14

## 2024-03-14 NOTE — DISCHARGE INSTRUCTIONS
Pacemaker and Defibrillator Discharge Instructions     Activity  Plan to rest tonight and tomorrow.  Avoid drinking alcohol for next 24 hours.  Do not drive after procedure until 1-week device check appointment, unless otherwise instructed by your cardiologist.   Wear sling for next 24 hours, then only at night as needed for 30 days to help assist with arm restrictions while sleeping.      Arm Restrictions:  For 30 days after implant:  do not lift arm with implanted device above your head or behind your back. Arm is to remain below your shoulder and in front of your body.   do not lift more than 10 lbs with affected arm   do not perform repetitive cycling motion with affected arm (swimming, golfing, bowling, tennis, exercise machines, raking, vacuuming, snow shoveling).   Dr. Rodrigues Only Patients: Do not perform repetitive cycling motion for 90 days total     Incision Care/Bathing  Keep incision site completely dry for 5 days after surgery. After day 5, you can remove top dressing and shower, letting clean soapy water run over incision area, patting dry.   The white steri-strips placed directly over the incision will gradually fall off over the next few weeks and can be physically removed after 3 weeks. Do not take them off before this time. (If you have a zipline dressing, this will be removed by device nurse or MD in 4 weeks)   Keep procedure site clean and dry, do not apply any ointments, creams, lotions to the incision.   Look at your incision daily to monitor for signs and symptoms of infection (redness, swelling, drainage, foul odor from procedure site)  Do not cover incision with extra dressings or gauze unless otherwise instructed.   Do not submerge incision in water, take whirlpool baths or swim for 30 days or until site is completely healed.      When to notify your physician:   If you have chest pain, shortness of breath or persistent cough  If you experience any signs of fever (temperature >101),  chills, infection (redness, swelling, thick yellow drainage, foul order from procedure site)  New or worsening swelling of arm with implanted device   If an ICD was inserted and you receive a shock and have no symptoms, call [facility] device clinic. If you have symptoms (fainting, near fainting, dizziness, lightheadedness, chest pain, shortness of breath, palpitations), call 911.     [facility] Device Clinic Direct Line: [phone]. Monday-Friday. 8:30AM-4PM.   [facility] Main Office: [phone] Monday- Friday 8AM-5PM   [facility] Main Office: [phone] Monday-Friday 8AM-5PM

## 2024-03-14 NOTE — PLAN OF CARE
Patient is received around 0730. A&O x 4, denies a chest pain and dizziness. Lungs are clear, RA. NSR on tele. Last BM  3/12/24, bowel sounds active x 4, abdomen soft and non tender. All needs met, call light within the reach. Pt updated with plan of care.             Problem: Diabetes/Glucose Control  Goal: Glucose maintained within prescribed range  Description: INTERVENTIONS:  - Monitor Blood Glucose as ordered  - Assess for signs and symptoms of hyperglycemia and hypoglycemia  - Administer ordered medications to maintain glucose within target range  - Assess barriers to adequate nutritional intake and initiate nutrition consult as needed  - Instruct patient on self management of diabetes  Outcome: Progressing     Problem: Patient/Family Goals  Goal: Patient/Family Long Term Goal  Description: Patient's Long Term Goal: To go home    Interventions:  - Trend labs/vs  - See additional Care Plan goals for specific interventions  Outcome: Progressing  Goal: Patient/Family Short Term Goal  Description: Patient's Short Term Goal: Pain management    Interventions:   - Reposition  - Take pain meds prn  - See additional Care Plan goals for specific interventions  Outcome: Progressing     Problem: CARDIOVASCULAR - ADULT  Goal: Maintains optimal cardiac output and hemodynamic stability  Description: INTERVENTIONS:  - Monitor vital signs, rhythm, and trends  - Monitor for bleeding, hypotension and signs of decreased cardiac output  - Evaluate effectiveness of vasoactive medications to optimize hemodynamic stability  - Monitor arterial and/or venous puncture sites for bleeding and/or hematoma  - Assess quality of pulses, skin color and temperature  - Assess for signs of decreased coronary artery perfusion - ex. Angina  - Evaluate fluid balance, assess for edema, trend weights  Outcome: Progressing  Goal: Absence of cardiac arrhythmias or at baseline  Description: INTERVENTIONS:  - Continuous cardiac monitoring, monitor vital  signs, obtain 12 lead EKG if indicated  - Evaluate effectiveness of antiarrhythmic and heart rate control medications as ordered  - Initiate emergency measures for life threatening arrhythmias  - Monitor electrolytes and administer replacement therapy as ordered  Outcome: Progressing     Problem: PAIN - ADULT  Goal: Verbalizes/displays adequate comfort level or patient's stated pain goal  Description: INTERVENTIONS:  - Encourage pt to monitor pain and request assistance  - Assess pain using appropriate pain scale  - Administer analgesics based on type and severity of pain and evaluate response  - Implement non-pharmacological measures as appropriate and evaluate response  - Consider cultural and social influences on pain and pain management  - Manage/alleviate anxiety  - Utilize distraction and/or relaxation techniques  - Monitor for opioid side effects  - Notify MD/LIP if interventions unsuccessful or patient reports new pain  - Anticipate increased pain with activity and pre-medicate as appropriate  Outcome: Progressing     Problem: RISK FOR INFECTION - ADULT  Goal: Absence of fever/infection during anticipated neutropenic period  Description: INTERVENTIONS  - Monitor WBC  - Administer growth factors as ordered  - Implement neutropenic guidelines  Outcome: Progressing     Problem: SAFETY ADULT - FALL  Goal: Free from fall injury  Description: INTERVENTIONS:  - Assess pt frequently for physical needs  - Identify cognitive and physical deficits and behaviors that affect risk of falls.  - [place] fall precautions as indicated by assessment.  - Educate pt/family on patient safety including physical limitations  - Instruct pt to call for assistance with activity based on assessment  - Modify environment to reduce risk of injury  - Provide assistive devices as appropriate  - Consider OT/PT consult to assist with strengthening/mobility  - Encourage toileting schedule  Outcome: Progressing

## 2024-03-14 NOTE — PROGRESS NOTES
Pt discharged home.Tele removed, IV removed.F/U instructions provided and discussed. Pt and family verbalized understanding. Pt wheeled down by staff with belongins. Discharge instructions including medications and follow ups are given. Pt left denying any complains of pain, malaise, or cardiac symptoms.All needs met by staff.

## 2024-03-14 NOTE — PLAN OF CARE
Assumed care at 1930. Pt is A&Ox4. Pt is on RA, O2 sats WNL. NSR on tele, BP elevated- MD aware. Continent of B&B. C/o mild pain/soreness at left upper chest post surgical site and right arm at old infiltration site. Pt refused PRN medications, warm packs provided. Up w/ SBA, tolerating well. Plan of care reviewed with patient, verbalizes understanding, all needs addressed at this time, pt seems to be resting comfortably. Call light within reach. Left upper chest soft, clean/dry/intact, no swelling/hematoma, some mild bruising.    POC: IV atbx overnight, CXR in AM 3/14, possible DC?    Problem: Diabetes/Glucose Control  Goal: Glucose maintained within prescribed range  Description: INTERVENTIONS:  - Monitor Blood Glucose as ordered  - Assess for signs and symptoms of hyperglycemia and hypoglycemia  - Administer ordered medications to maintain glucose within target range  - Assess barriers to adequate nutritional intake and initiate nutrition consult as needed  - Instruct patient on self management of diabetes  Outcome: Progressing     Problem: Patient/Family Goals  Goal: Patient/Family Long Term Goal  Description: Patient's Long Term Goal: To go home    Interventions:  - Trend labs/vs  - See additional Care Plan goals for specific interventions  Outcome: Progressing  Goal: Patient/Family Short Term Goal  Description: Patient's Short Term Goal: Pain management    Interventions:   - Reposition  - Take pain meds prn  - See additional Care Plan goals for specific interventions  Outcome: Progressing     Problem: CARDIOVASCULAR - ADULT  Goal: Maintains optimal cardiac output and hemodynamic stability  Description: INTERVENTIONS:  - Monitor vital signs, rhythm, and trends  - Monitor for bleeding, hypotension and signs of decreased cardiac output  - Evaluate effectiveness of vasoactive medications to optimize hemodynamic stability  - Monitor arterial and/or venous puncture sites for bleeding and/or hematoma  - Assess  quality of pulses, skin color and temperature  - Assess for signs of decreased coronary artery perfusion - ex. Angina  - Evaluate fluid balance, assess for edema, trend weights  Outcome: Progressing  Goal: Absence of cardiac arrhythmias or at baseline  Description: INTERVENTIONS:  - Continuous cardiac monitoring, monitor vital signs, obtain 12 lead EKG if indicated  - Evaluate effectiveness of antiarrhythmic and heart rate control medications as ordered  - Initiate emergency measures for life threatening arrhythmias  - Monitor electrolytes and administer replacement therapy as ordered  Outcome: Progressing     Problem: PAIN - ADULT  Goal: Verbalizes/displays adequate comfort level or patient's stated pain goal  Description: INTERVENTIONS:  - Encourage pt to monitor pain and request assistance  - Assess pain using appropriate pain scale  - Administer analgesics based on type and severity of pain and evaluate response  - Implement non-pharmacological measures as appropriate and evaluate response  - Consider cultural and social influences on pain and pain management  - Manage/alleviate anxiety  - Utilize distraction and/or relaxation techniques  - Monitor for opioid side effects  - Notify MD/LIP if interventions unsuccessful or patient reports new pain  - Anticipate increased pain with activity and pre-medicate as appropriate  Outcome: Progressing     Problem: RISK FOR INFECTION - ADULT  Goal: Absence of fever/infection during anticipated neutropenic period  Description: INTERVENTIONS  - Monitor WBC  - Administer growth factors as ordered  - Implement neutropenic guidelines  Outcome: Progressing     Problem: SAFETY ADULT - FALL  Goal: Free from fall injury  Description: INTERVENTIONS:  - Assess pt frequently for physical needs  - Identify cognitive and physical deficits and behaviors that affect risk of falls.  - [place] fall precautions as indicated by assessment.  - Educate pt/family on patient safety including  physical limitations  - Instruct pt to call for assistance with activity based on assessment  - Modify environment to reduce risk of injury  - Provide assistive devices as appropriate  - Consider OT/PT consult to assist with strengthening/mobility  - Encourage toileting schedule  Outcome: Progressing

## 2024-03-14 NOTE — PROGRESS NOTES
[facility] Cardiology Progress Note    Jalen Jeronimo Patient Status:  Inpatient    3/3/1960 MRN [id number]   Location [facility] 2NE-A Attending Constanza Hill MD   Hosp Day # 6 PCP Tommy Allen     Subjective:  No acute events overnight  Pain controlled  Denies sob,  Anxious for discharge     Objective:  BP (!) 165/78 (BP Location: Right leg)   Pulse 71   Temp 98.2 °F (36.8 °C) (Oral)   Resp 20   Ht 5' 8\" (1.727 m)   Wt 286 lb 2.5 oz (129.8 kg)   SpO2 97%   BMI 43.51 kg/m²     Telemetry: SR      Intake/Output:    Intake/Output Summary (Last 24 hours) at 3/14/2024 1008  Last data filed at 3/13/2024 2248  Gross per 24 hour   Intake 240 ml   Output 0 ml   Net 240 ml       Last 3 Weights   24 0600 286 lb 2.5 oz (129.8 kg)   24 0400 288 lb 5.8 oz (130.8 kg)   03/10/24 0600 287 lb 12.8 oz (130.5 kg)   24 0400 292 lb 12.3 oz (132.8 kg)   24 2243 292 lb 12.3 oz (132.8 kg)   24 285 lb (129.3 kg)   20 1019 (!) 302 lb (137 kg)       Labs:  Recent Labs   Lab 24  0433 24  0644 24  0554   * 164* 154*   BUN 16 15 16   CREATSERUM 1.10 0.87 0.93   EGFRCR 74 95 91   CA 9.5 9.7 9.2    139 134*   K 4.0 4.2 4.1    107 104   CO2 27.0 26.0 25.0     Recent Labs   Lab 24  0457 24  1015 24  0554   RBC 6.79* 7.11* 6.83*   HGB 12.7* 13.4 13.2   HCT 40.9 42.2 40.7   MCV 60.2* 59.4* 59.6*   MCH 18.7* 18.8* 19.3*   MCHC 31.1 31.8 32.4   RDW 18.5 18.4 18.4   NEPRELIM 5.34 5.74 5.28   WBC 8.2 8.1 7.6   PLT 57.0* 60.0* 64.0*         Recent Labs   Lab 24   TROPHS 12       Diagnostics:             Physical Exam:    Physical Exam  Constitutional:       Appearance: He is obese.   Cardiovascular:      Rate and Rhythm: Normal rate and regular rhythm.      Comments: Left chest wall dressing clean, dry, intact   Pulmonary:      Effort: Pulmonary effort is normal.      Breath sounds: No rales.   Abdominal:      Palpations:  Abdomen is soft.   Musculoskeletal:      Right lower leg: No edema.      Left lower leg: No edema.   Skin:     General: Skin is warm and dry.   Neurological:      Mental Status: He is alert and oriented to person, place, and time.         Medications:   metoprolol tartrate  50 mg Oral 2x Daily(Beta Blocker)    amiodarone  400 mg Oral TID    potassium chloride  40 mEq Oral Daily    insulin aspart  1-68 Units Subcutaneous TID CC    insulin aspart  1-30 Units Subcutaneous TID AC and HS     Cardiac MRI:   The exam is somewhat limited due to motion.  There is probable subendocardial enhancement extending into the mid myocardium in the region of the inferior lateral wall near the base.  This appearance may relate to prior infarct.  An   additional focus of enhancement in the inferior septal region at the level of the mid chamber appears mid myocardial which is somewhat nonspecific.  Differential considerations are broad and would include myocarditis and infiltrating cardiomyopathy.    Consider a follow-up exam.      Prior echocardiogram had questioned akinesis in the region of the apex although no focal wall motion abnormality is seen on this exam.  No thrombus is identified.      Critical results were discussed with GAVIN Mahoney by Dr. Lopez at approximately 1518 on 3/13/24.  Read back was performed.     Assessment:    65 yo presenting with chest pain and LH, was in VT on presentation.      Sustained Monomorphic Ventricular Tachycardia: SR after 3 shocks, EF 60%, no sig CAD on [facility] 3/8, s/p Abbot Dual chamber PPM with Dr. Babin 3/13/24 with appropriate sensing and threshold today, no pneumo on cxr. MRI results as above. No VT in past 48 hours on amio.   ITP: plt 45-60  DM2     Plan:    Likely can dc today, will follow up with future plans given MRI results  Needs sleepstudy  Amio 400mg bid on discharge x7 days and taper down to 400mg daily.   No driving until seen in clinic    Teri Avila,  APRN  3/14/2024  10:08 AM  Ph [phone] ([place])  Ph [phone] ([place])

## 2024-03-14 NOTE — DISCHARGE SUMMARY
[facility]     Jalen Jeroniom Patient Status:  Inpatient    3/3/1960 MRN [id number]   Location [facility] 2NE-A Attending Constanza Hill MD   Hosp Day # 6 PCP Tommy Allen     Date of Admission: 3/8/2024  Date of Discharge:   3/14/24    Discharge Disposition: home    Discharge Diagnosis:  #suspected cellulitis of RUE- thrombophlebitis on US    #V. Tach-sustained MM  Aicd by EP- CBC today   Heart mri 3/13  Stable on Amiodarone  LVEF normal           #Diabetes  -Hyperglycemia protocol     #Hypertension  -Lisinopril     #Hyperlipidemia  -Statin    History of Present Illness:   Jalen Jeronimo is a 64 year old male with past medical history of diabetes who presents ED for chest pain.  Patient reports he has had chest pain for the past few weeks.  Today he felt diaphoretic and dizzy after dinner.  He was also nauseous.  He came to ER for further evaluation.  In the ER, he was found to be in V. tach.  He was shocked 3 times and is now in normal sinus rhythm.  He denies any dyspnea, headaches, dizziness fevers, chills, abdominal pain, back     Brief Synopsis:   Pt was admitted, noted to have  sustained monomorphic Vtach- cards consulted and - dual chamber PPM inserted 3/13/24. Pt also had MRI done with subendocardial enhancement - ? Due to prior infarct - further findings concerning for myocarditis, infiltrating cardiomyopathy. He will f/u with cards as OP.       Lace+ Score: 38  59-90 High Risk  29-58 Medium Risk  0-28   Low Risk       TCM Follow-Up Recommendation:  LACE > 58: High Risk of readmission after discharge from the hospital.      Procedures during hospitalization:   PPM insertion 3/13  Cardiac MRI    Cardiac MRI:   The exam is somewhat limited due to motion.  There is probable subendocardial enhancement extending into the mid myocardium in the region of the inferior lateral wall near the base.  This appearance may relate to prior infarct.  An   additional focus of enhancement in  the inferior septal region at the level of the mid chamber appears mid myocardial which is somewhat nonspecific.  Differential considerations are broad and would include myocarditis and infiltrating cardiomyopathy.    Consider a follow-up exam.      Prior echocardiogram had questioned akinesis in the region of the apex although no focal wall motion abnormality is seen on this exam.  No thrombus is identified.     Incidental or significant findings and recommendations (brief descriptions):  As above    Lab/Test results pending at Discharge:   no    Consultants:  Cards     Discharge Medication List:     Discharge Medications        START taking these medications        Instructions Prescription details   amiodarone 200 MG Tabs  Commonly known as: Pacerone      Take 2 tablets (400 mg total) by mouth 2 (two) times daily. Then 400mg (2tabs) once daily morving forward.   Quantity: 46 tablet  Refills: 1     cephalexin 500 MG Caps  Commonly known as: Keflex      Take 1 capsule (500 mg total) by mouth 4 (four) times daily for 12 days.   Stop taking on: March 26, 2024  Quantity: 48 capsule  Refills: 0     HYDROcodone-acetaminophen 5-325 MG Tabs  Commonly known as: Norco      Take 1 tablet by mouth every 6 (six) hours as needed for Pain.   Quantity: 6 tablet  Refills: 0     metoprolol tartrate 50 MG Tabs  Commonly known as: Lopressor      Take 1 tablet (50 mg total) by mouth 2x Daily(Beta Blocker).   Quantity: 60 tablet  Refills: 3            CONTINUE taking these medications        Instructions Prescription details   atorvastatin 40 MG Tabs  Commonly known as: Lipitor      Take 1 tablet (40 mg total) by mouth nightly.   Refills: 0     cholecalciferol 125 MCG (5000 UT) Tabs  Generic drug: cholecalciferol      Take 1 tablet (5,000 Units total) by mouth daily.   Refills: 0     lisinopril 20 MG Tabs  Commonly known as: Prinivil; Zestril      Take 1 tablet (20 mg total) by mouth daily.   Refills: 0            ASK your doctor about  these medications        Instructions Prescription details   Farxiga 10 MG Tabs  Generic drug: dapagliflozin      Take 1 tablet (10 mg total) by mouth daily with breakfast.   Refills: 0     glipiZIDE 5 MG Tabs  Commonly known as: Glucotrol      Take 2 tablets (10 mg total) by mouth at bedtime.   Refills: 0     metFORMIN HCl 1000 MG Tabs  Commonly known as: GLUCOPHAGE      Take 1 tablet (1,000 mg total) by mouth 2 (two) times daily with meals.   Refills: 0     semaglutide 2 MG/3ML Sopn  Commonly known as: Ozempic      Inject 2 mL into the skin once a week.   Refills: 0               Where to Get Your Medications        These medications were sent to [facility]/pharmacy #[id number] - [place], [place] - [address] [phone], [phone] [address] [address]      Phone: [phone]   amiodarone 200 MG Tabs  cephalexin 500 MG Caps  HYDROcodone-acetaminophen 5-325 MG Tabs  metoprolol tartrate 50 MG Tabs         [place] reviewed[place] n/    Follow-up appointment:   Brayan Babin MD  [address] [address] [phone]    Follow up in 1 week(s)  office will call to schedule follow up    Tommy Allen  [facility] [address] [facility] [address]  [facility] [address] [phone]    Schedule an appointment as soon as possible for a visit      Appointments for Next 30 Days 3/14/2024 - 2024      None            Vital signs:  Temp:  [97.7 °F (36.5 °C)-98.4 °F (36.9 °C)] 98.2 °F (36.8 °C)  Pulse:  [69-81] 71  Resp:  [18-20] 20  BP: (121-197)/() 165/78  SpO2:  [92 %-97 %] 97 %    Physical Exam:    General: No acute distress   Lungs: clear to auscultation  Cardiovascular: S1, S2  Abdomen: Soft      -----------------------------------------------------------------------------------------------  PATIENT DISCHARGE INSTRUCTIONS: See electronic chart    Jocelyn Stout MD    Total time spent on discharge plannin minutes     The  Cures Act makes medical  notes like these available to patients in the interest of transparency. Please be advised this is a medical document. Medical documents are intended to carry relevant information, facts as evident, and the clinical opinion of the practitioner. The medical note is intended as peer to peer communication and may appear blunt or direct. It is written in medical language and may contain abbreviations or verbiage that are unfamiliar.

## 2024-05-21 ENCOUNTER — ORDER TRANSCRIPTION (OUTPATIENT)
Dept: ADMINISTRATIVE | Facility: HOSPITAL | Age: 64
End: 2024-05-21

## 2024-05-21 DIAGNOSIS — I10 HYPERTENSION: ICD-10-CM

## 2024-05-21 DIAGNOSIS — E78.5 HYPERLIPEMIA: Primary | ICD-10-CM

## 2024-05-21 DIAGNOSIS — I47.20 VT (VENTRICULAR TACHYCARDIA) (HCC): ICD-10-CM

## 2024-05-21 DIAGNOSIS — Z95.810 S/P INTERNAL CARDIAC DEFIBRILLATOR PROCEDURE: ICD-10-CM

## 2024-06-18 ENCOUNTER — LAB ENCOUNTER (OUTPATIENT)
Dept: LAB | Facility: HOSPITAL | Age: 64
End: 2024-06-18
Attending: INTERNAL MEDICINE

## 2024-06-18 ENCOUNTER — RT VISIT (OUTPATIENT)
Dept: RESPIRATORY THERAPY | Facility: HOSPITAL | Age: 64
End: 2024-06-18
Attending: INTERNAL MEDICINE

## 2024-06-18 DIAGNOSIS — E78.5 HYPERLIPEMIA: ICD-10-CM

## 2024-06-18 DIAGNOSIS — I10 HYPERTENSION: ICD-10-CM

## 2024-06-18 DIAGNOSIS — Z95.810 S/P INTERNAL CARDIAC DEFIBRILLATOR PROCEDURE: ICD-10-CM

## 2024-06-18 DIAGNOSIS — I47.20 VT (VENTRICULAR TACHYCARDIA) (HCC): ICD-10-CM

## 2024-06-18 DIAGNOSIS — E78.5 HYPERLIPEMIA: Primary | ICD-10-CM

## 2024-06-18 DIAGNOSIS — Z95.810 AUTOMATIC IMPLANTABLE CARDIAC DEFIBRILLATOR IN SITU: ICD-10-CM

## 2024-06-18 LAB
ALBUMIN SERPL-MCNC: 3.6 G/DL (ref 3.4–5)
ALBUMIN/GLOB SERPL: 1 {RATIO} (ref 1–2)
ALP LIVER SERPL-CCNC: 89 U/L
ALT SERPL-CCNC: 70 U/L
ANION GAP SERPL CALC-SCNC: 6 MMOL/L (ref 0–18)
AST SERPL-CCNC: 46 U/L (ref 15–37)
BILIRUB SERPL-MCNC: 1.2 MG/DL (ref 0.1–2)
BUN BLD-MCNC: 18 MG/DL (ref 9–23)
CALCIUM BLD-MCNC: 9.4 MG/DL (ref 8.5–10.1)
CHLORIDE SERPL-SCNC: 108 MMOL/L (ref 98–112)
CO2 SERPL-SCNC: 25 MMOL/L (ref 21–32)
CREAT BLD-MCNC: 1.21 MG/DL
EGFRCR SERPLBLD CKD-EPI 2021: 66 ML/MIN/1.73M2 (ref 60–?)
FASTING STATUS PATIENT QL REPORTED: YES
GLOBULIN PLAS-MCNC: 3.7 G/DL (ref 2.8–4.4)
GLUCOSE BLD-MCNC: 114 MG/DL (ref 70–99)
OSMOLALITY SERPL CALC.SUM OF ELEC: 291 MOSM/KG (ref 275–295)
POTASSIUM SERPL-SCNC: 4.2 MMOL/L (ref 3.5–5.1)
PROT SERPL-MCNC: 7.3 G/DL (ref 6.4–8.2)
SODIUM SERPL-SCNC: 139 MMOL/L (ref 136–145)
TSI SER-ACNC: 1.71 MIU/ML (ref 0.36–3.74)

## 2024-06-18 PROCEDURE — 36415 COLL VENOUS BLD VENIPUNCTURE: CPT

## 2024-06-18 PROCEDURE — 94729 DIFFUSING CAPACITY: CPT | Performed by: INTERNAL MEDICINE

## 2024-06-18 PROCEDURE — 94010 BREATHING CAPACITY TEST: CPT | Performed by: INTERNAL MEDICINE

## 2024-06-18 PROCEDURE — 94726 PLETHYSMOGRAPHY LUNG VOLUMES: CPT | Performed by: INTERNAL MEDICINE

## 2024-06-18 PROCEDURE — 80053 COMPREHEN METABOLIC PANEL: CPT

## 2024-06-18 PROCEDURE — 84443 ASSAY THYROID STIM HORMONE: CPT

## 2024-06-21 PROBLEM — I47.20 VT (VENTRICULAR TACHYCARDIA) (HCC): Status: ACTIVE | Noted: 2024-06-21

## 2024-06-21 PROBLEM — Z95.810 S/P INTERNAL CARDIAC DEFIBRILLATOR PROCEDURE: Status: ACTIVE | Noted: 2024-06-21

## 2024-06-21 NOTE — PROCEDURES
Findings:  FEV1 is 3.60L, z-score of 1.43.  FVC is 4.21L, z-score of 0.61.  FEV1/ FVC ratio is 0.86.  The flow-volume loop demonstrates a normal pattern.  The TLC is 6.82L, z-score of 0.13.  The residual volume 2.61L, z-score of 0.57.  The diffusion capacity is 27.83 with z-score of 0.64. When corrected for alveolar volume, the diffusion capacity is 4.48 with z-score of 0.46.  Impression:  There is no airway obstruction on spirometry and visualized on flow-volume loop.  Lung volumes are normal.  Diffusion capacity is normal.  There are no previous pulmonary function tests available for comparison.   Of note, this testing was performed in accordance to ATS/ERS interpretation guidelines with the use of upper and lower limits of normal as well as z-score references. Previous testing (before March 2024) was not performed at [facility] using z-scores and so comparison to previous testing should be taken with caution.

## (undated) NOTE — LETTER
[address]  [address]  Consent for Procedure/Sedation  Date: 03/13/2024         Time: 1515    I hereby authorize Dr. Brayan Babin, my physician and his/her assistants (if applicable), which may include medical students, residents, and/or fellows, to perform the following surgical operation/ procedure and administer such anesthesia as may be determined necessary by my physician: Implantation of dual chamber ICD on Jalen Jeronimo  2.   I recognize that during the surgical operation/procedure, unforeseen conditions may necessitate additional or different procedures than those listed above.  I, therefore, further authorize and request that the above-named surgeon, assistants, or designees perform such procedures as are, in their judgment, necessary and desirable.    3.   My surgeon/physician has discussed prior to my surgery the potential benefits, risks and side effects of this procedure; the likelihood of achieving goals; and potential problems that might occur during recuperation.  They also discussed reasonable alternatives to the procedure, including risks, benefits, and side effects related to the alternatives and risks related to not receiving this procedure.  I have had all my questions answered and I acknowledge that no guarantee has been made as to the result that may be obtained.    4.   Should the need arise during my operation/procedure, which includes change of level of care prior to discharge, I also consent to the administration of blood and/or blood products.  Further, I understand that despite careful testing and screening of blood or blood products by collecting agencies, I may still be subject to ill effects as a result of receiving a blood transfusion and/or blood products.  The following are some, but not all, of the potential risks that can occur: fever and allergic reactions, hemolytic reactions, transmission of diseases such as Hepatitis, AIDS and  Cytomegalovirus (CMV) and fluid overload.  In the event that I wish to have an autologous transfusion of my own blood, or a directed donor transfusion, I will discuss this with my physician.   Check only if Refusing Blood or Blood Products  I understand refusal of blood or blood products as deemed necessary by my physician may have serious consequences to my condition to include possible death. I hereby assume responsibility for my refusal and release the hospital, its personnel, and my physicians from any responsibility for the consequences of my refusal.         o  Refuse         5.   I authorize the use of any specimen, organs, tissues, body parts or foreign objects that may be removed from my body during the operation/procedure for diagnosis, research or teaching purposes and their subsequent disposal by hospital authorities.  I also authorize the release of specimen test results and/or written reports to my treating physician on the hospital medical staff or other referring or consulting physicians involved in my care, at the discretion of the Pathologist or my treating physician.    6.   I consent to the photographing or videotaping of the operations or procedures to be performed, including appropriate portions of my body for medical, scientific, or educational purposes, provided my identity is not revealed by the pictures or by descriptive texts accompanying them.  If the procedure has been photographed/videotaped, the surgeon will obtain the original picture, image, videotape or CD.  The hospital will not be responsible for storage, release or maintenance of the picture, image, tape or CD.    7.   I consent to the presence of a  or observers in the operating room as deemed necessary by my physician or their designees.    8.   I recognize that in the event my procedure results in extended X-Ray/fluoroscopy time, I may develop a skin reaction.    9. If I have a Do Not Attempt Resuscitation  (DNAR) order in place, that status will be suspended while in the operating room, procedural suite, and during the recovery period unless otherwise explicitly stated by me (or a person authorized to consent on my behalf). The surgeon or my attending physician will determine when the applicable recovery period ends for purposes of reinstating the DNAR order.  10. Patients having a sterilization procedure: I understand that if the procedure is successful the results will be permanent and it will therefore be impossible for me to inseminate, conceive, or bear children.  I also understand that the procedure is intended to result in sterility, although the result has not been guaranteed.   11. I acknowledge that my physician has explained sedation/analgesia administration to me including the risk and benefits I consent to the administration of sedation/analgesia as may be necessary or desirable in the judgment of my physician.    I CERTIFY THAT I HAVE READ AND FULLY UNDERSTAND THE ABOVE CONSENT TO OPERATION and/or OTHER PROCEDURE.        ____________________________________       _________________________________      ______________________________  Signature of Patient         Signature of Responsible Person        Printed Name of Responsible Person        ____________________________________      _________________________________      ______________________________       Signature of Witness          Relationship to Patient                       Date                                       Time  Patient Name: Jalen Jeronimo  : 3/3/1960    Reviewed: 2024   Printed: 2024  Medical Record #: [id number] Page 1 of 1

## (undated) NOTE — LETTER
Patient Name: Jalen Jeronimo        : 3/3/1960       Medical Record #: [id number]    CONSENT FOR PROCEDURES/SEDATION    Date: 3/13/2024       Time: 7:52 AM        1. I authorize the performance upon Jalen Jeronimo the following:    Automated Implantable Defibrillator     2. I authorize Dr. Babin  (and whomever is designated as the doctor’s assistant), to perform the above mentioned procedures.    3. If any unforeseen conditions arise during this procedure calling for additional procedures, operations, or medications (including anesthesia and blood transfusion), I  further request and authorize the doctor to do whatever he/she deems advisable in my interest.    4. I consent to the taking and reproduction of any photographs in the course of this procedure for professional purposes.    5. I consent to the administration of such sedation as may be considered necessary or advisable by the physician responsible for this service, with the exception of  _____________________________.    6. I have been informed by my doctor of the nature and purpose of this procedure/sedation, possible alternative methods of treatment, risk involved and possible complications.      Signature of Patient:  ___________________________    Signature of person authorized to consent for patient: Relationship to patient:  ___________________________    ___________________    Witness: ____________________     Date: ______________    Provider: ____________________     Date: ______________